# Patient Record
Sex: FEMALE | Race: WHITE | Employment: FULL TIME
[De-identification: names, ages, dates, MRNs, and addresses within clinical notes are randomized per-mention and may not be internally consistent; named-entity substitution may affect disease eponyms.]

---

## 2017-01-11 ENCOUNTER — TELEPHONE (OUTPATIENT)
Dept: NEUROLOGY | Facility: CLINIC | Age: 57
End: 2017-01-11

## 2017-01-12 ENCOUNTER — TELEPHONE (OUTPATIENT)
Dept: NEUROSURGERY | Facility: CLINIC | Age: 57
End: 2017-01-12

## 2017-01-16 ENCOUNTER — TELEPHONE (OUTPATIENT)
Dept: NEUROSURGERY | Facility: CLINIC | Age: 57
End: 2017-01-16

## 2017-03-07 ENCOUNTER — OFFICE VISIT (OUTPATIENT)
Dept: NEUROLOGY | Facility: CLINIC | Age: 57
End: 2017-03-07

## 2017-03-07 VITALS
SYSTOLIC BLOOD PRESSURE: 118 MMHG | HEIGHT: 65 IN | DIASTOLIC BLOOD PRESSURE: 90 MMHG | HEART RATE: 64 BPM | WEIGHT: 167.8 LBS | BODY MASS INDEX: 27.96 KG/M2

## 2017-03-07 DIAGNOSIS — I69.30 CHRONIC ISCHEMIC RIGHT MCA STROKE: Primary | ICD-10-CM

## 2017-03-07 PROCEDURE — 99214 OFFICE O/P EST MOD 30 MIN: CPT | Performed by: PSYCHIATRY & NEUROLOGY

## 2017-03-20 ENCOUNTER — EMPLOYEE WELLNESS (OUTPATIENT)
Dept: OTHER | Age: 57
End: 2017-03-20

## 2017-03-21 ENCOUNTER — TELEPHONE (OUTPATIENT)
Dept: NEUROLOGY | Age: 57
End: 2017-03-21

## 2017-03-30 RX ORDER — LAMOTRIGINE 25 MG/1
50 TABLET ORAL 2 TIMES DAILY
Qty: 120 TABLET | Refills: 2 | Status: SHIPPED | OUTPATIENT
Start: 2017-03-30 | End: 2017-06-22 | Stop reason: SDUPTHER

## 2017-04-25 ENCOUNTER — TELEPHONE (OUTPATIENT)
Dept: NEUROLOGY | Age: 57
End: 2017-04-25

## 2017-06-19 ENCOUNTER — OFFICE VISIT (OUTPATIENT)
Dept: NEUROLOGY | Age: 57
End: 2017-06-19
Payer: COMMERCIAL

## 2017-06-19 VITALS
SYSTOLIC BLOOD PRESSURE: 121 MMHG | HEIGHT: 65 IN | WEIGHT: 164.2 LBS | BODY MASS INDEX: 27.36 KG/M2 | DIASTOLIC BLOOD PRESSURE: 83 MMHG | HEART RATE: 65 BPM

## 2017-06-19 DIAGNOSIS — I60.9 SUBARACHNOID BLEED (HCC): Primary | ICD-10-CM

## 2017-06-19 PROCEDURE — 99214 OFFICE O/P EST MOD 30 MIN: CPT | Performed by: PSYCHIATRY & NEUROLOGY

## 2017-06-22 RX ORDER — LAMOTRIGINE 25 MG/1
TABLET ORAL
Qty: 14 TABLET | Refills: 0 | Status: SHIPPED | OUTPATIENT
Start: 2017-06-22 | End: 2018-06-15

## 2018-01-04 ENCOUNTER — TELEPHONE (OUTPATIENT)
Dept: NEUROLOGY | Age: 58
End: 2018-01-04

## 2018-01-04 NOTE — TELEPHONE ENCOUNTER
Pt called in stating she was tapered off of Lamictal last year. She was wondering if she could be put back on it, she is having some increased nerve pain and depression. She has an appt with you on 7/9/18.

## 2018-03-20 VITALS — BODY MASS INDEX: 26.96 KG/M2 | WEIGHT: 162 LBS

## 2018-04-03 ENCOUNTER — EMPLOYEE WELLNESS (OUTPATIENT)
Dept: OTHER | Age: 58
End: 2018-04-03

## 2018-04-03 LAB
CHOLESTEROL, TOTAL: 203 MG/DL (ref 0–199)
FASTING: YES
GLUCOSE BLD-MCNC: 94 MG/DL (ref 74–109)
HDLC SERPL-MCNC: 89 MG/DL (ref 40–90)
LDL CHOLESTEROL CALCULATED: 101 MG/DL
TRIGL SERPL-MCNC: 66 MG/DL (ref 0–199)

## 2018-04-10 VITALS — WEIGHT: 176 LBS | BODY MASS INDEX: 29.29 KG/M2

## 2018-06-15 ENCOUNTER — INITIAL CONSULT (OUTPATIENT)
Dept: NEUROLOGY | Age: 58
End: 2018-06-15
Payer: COMMERCIAL

## 2018-06-15 VITALS
HEART RATE: 72 BPM | SYSTOLIC BLOOD PRESSURE: 122 MMHG | BODY MASS INDEX: 30.73 KG/M2 | DIASTOLIC BLOOD PRESSURE: 80 MMHG | WEIGHT: 180 LBS | HEIGHT: 64 IN

## 2018-06-15 DIAGNOSIS — I69.30 HISTORY OF STROKE WITH RESIDUAL DEFICIT: Primary | ICD-10-CM

## 2018-06-15 PROCEDURE — 99244 OFF/OP CNSLTJ NEW/EST MOD 40: CPT | Performed by: PSYCHIATRY & NEUROLOGY

## 2018-06-15 RX ORDER — OMEPRAZOLE 20 MG/1
20 CAPSULE, DELAYED RELEASE ORAL DAILY
COMMUNITY

## 2018-06-15 RX ORDER — LAMOTRIGINE 100 MG/1
100 TABLET ORAL 2 TIMES DAILY
Qty: 60 TABLET | Refills: 2 | Status: SHIPPED | OUTPATIENT
Start: 2018-06-15 | End: 2018-11-21 | Stop reason: SDUPTHER

## 2018-06-15 RX ORDER — LAMOTRIGINE 100 MG/1
50 TABLET ORAL 2 TIMES DAILY
Qty: 30 TABLET | Refills: 2 | Status: SHIPPED | OUTPATIENT
Start: 2018-06-15 | End: 2018-06-15 | Stop reason: SDUPTHER

## 2018-06-19 ENCOUNTER — TELEPHONE (OUTPATIENT)
Dept: NEUROLOGY | Age: 58
End: 2018-06-19

## 2018-06-23 LAB
FOLATE: >20 UG/L
VITAMIN B-12: 614.6 PG/ML (ref 200–950)

## 2018-06-29 ENCOUNTER — HOSPITAL ENCOUNTER (OUTPATIENT)
Dept: CT IMAGING | Age: 58
Discharge: HOME | End: 2018-06-29
Payer: COMMERCIAL

## 2018-06-29 ENCOUNTER — HOSPITAL ENCOUNTER (OUTPATIENT)
Dept: WOMENS IMAGING | Age: 58
Discharge: HOME | End: 2018-06-29
Payer: COMMERCIAL

## 2018-06-29 ENCOUNTER — HOSPITAL ENCOUNTER (OUTPATIENT)
Dept: NEUROLOGY | Age: 58
Discharge: HOME | End: 2018-06-29
Payer: COMMERCIAL

## 2018-06-29 DIAGNOSIS — I69.30 HISTORY OF STROKE WITH RESIDUAL DEFICIT: ICD-10-CM

## 2018-06-29 DIAGNOSIS — Z12.31 VISIT FOR SCREENING MAMMOGRAM: ICD-10-CM

## 2018-06-29 PROCEDURE — 70496 CT ANGIOGRAPHY HEAD: CPT

## 2018-06-29 PROCEDURE — 6360000004 HC RX CONTRAST MEDICATION: Performed by: PSYCHIATRY & NEUROLOGY

## 2018-06-29 PROCEDURE — 95819 EEG AWAKE AND ASLEEP: CPT

## 2018-06-29 PROCEDURE — 77067 SCR MAMMO BI INCL CAD: CPT

## 2018-06-29 PROCEDURE — 70498 CT ANGIOGRAPHY NECK: CPT

## 2018-06-29 RX ADMIN — IOPAMIDOL 85 ML: 755 INJECTION, SOLUTION INTRAVENOUS at 14:14

## 2018-08-13 ENCOUNTER — HOSPITAL ENCOUNTER (OUTPATIENT)
Dept: ULTRASOUND IMAGING | Age: 58
Discharge: HOME | End: 2018-08-13
Payer: COMMERCIAL

## 2018-08-13 DIAGNOSIS — E04.1 THYROID NODULE: ICD-10-CM

## 2018-08-13 DIAGNOSIS — E01.0 THYROMEGALY: ICD-10-CM

## 2018-08-13 PROCEDURE — 76536 US EXAM OF HEAD AND NECK: CPT

## 2018-10-01 ENCOUNTER — HOSPITAL ENCOUNTER (OUTPATIENT)
Dept: ULTRASOUND IMAGING | Age: 58
Discharge: HOME | End: 2018-10-01
Payer: COMMERCIAL

## 2018-10-01 DIAGNOSIS — E04.2 MULTINODULAR GOITER: ICD-10-CM

## 2018-10-01 PROCEDURE — 88172 CYTP DX EVAL FNA 1ST EA SITE: CPT

## 2018-10-01 PROCEDURE — 88173 CYTOPATH EVAL FNA REPORT: CPT

## 2018-10-01 PROCEDURE — 76942 ECHO GUIDE FOR BIOPSY: CPT

## 2018-10-01 PROCEDURE — 88177 CYTP FNA EVAL EA ADDL: CPT

## 2018-11-01 ENCOUNTER — HOSPITAL ENCOUNTER (OUTPATIENT)
Dept: ULTRASOUND IMAGING | Age: 58
Discharge: HOME | End: 2018-11-01
Payer: COMMERCIAL

## 2018-11-01 DIAGNOSIS — E04.2 NONTOXIC MULTINODULAR GOITER: ICD-10-CM

## 2018-11-07 ENCOUNTER — OFFICE VISIT (OUTPATIENT)
Dept: UROLOGY | Age: 58
End: 2018-11-07
Payer: COMMERCIAL

## 2018-11-07 ENCOUNTER — TELEPHONE (OUTPATIENT)
Dept: UROLOGY | Age: 58
End: 2018-11-07

## 2018-11-07 VITALS
HEIGHT: 65 IN | DIASTOLIC BLOOD PRESSURE: 72 MMHG | WEIGHT: 170 LBS | SYSTOLIC BLOOD PRESSURE: 120 MMHG | BODY MASS INDEX: 28.32 KG/M2

## 2018-11-07 DIAGNOSIS — R35.0 URINARY FREQUENCY: Primary | ICD-10-CM

## 2018-11-07 LAB
BILIRUBIN URINE: NEGATIVE
BLOOD URINE, POC: NEGATIVE
CHARACTER, URINE: CLEAR
COLOR, URINE: YELLOW
GLUCOSE URINE: NEGATIVE MG/DL
KETONES, URINE: NEGATIVE
LEUKOCYTE CLUMPS, URINE: NEGATIVE
NITRITE, URINE: NEGATIVE
PH, URINE: 6
POST VOID RESIDUAL (PVR): 17 ML
PROTEIN, URINE: NEGATIVE MG/DL
SPECIFIC GRAVITY, URINE: 1.01 (ref 1–1.03)
UROBILINOGEN, URINE: 0.2 EU/DL

## 2018-11-07 PROCEDURE — 81003 URINALYSIS AUTO W/O SCOPE: CPT | Performed by: NURSE PRACTITIONER

## 2018-11-07 PROCEDURE — 51798 US URINE CAPACITY MEASURE: CPT | Performed by: NURSE PRACTITIONER

## 2018-11-07 PROCEDURE — 99203 OFFICE O/P NEW LOW 30 MIN: CPT | Performed by: NURSE PRACTITIONER

## 2018-11-07 NOTE — PROGRESS NOTES
and script sent. If symptoms do not improve I would suggest Botox and we discussed this today. She is nervous that \"something is wrong. \" UA is negative. Send for cytology. Follow-up in 4 weeks.            Electronically signed by MARY ALICE Rapp CNP on 11/7/2018 at 2:36 PM

## 2018-11-08 ENCOUNTER — TELEPHONE (OUTPATIENT)
Dept: UROLOGY | Age: 58
End: 2018-11-08

## 2018-11-08 NOTE — TELEPHONE ENCOUNTER
Dilan Perkins. Please let patient know. I gave her samples. She can see how it goes. She does not want to take any anti-cholinergics she told me yesterday. We also discussed Botox.

## 2018-11-14 RX ORDER — TROSPIUM CHLORIDE ER 60 MG/1
60 CAPSULE ORAL DAILY
Qty: 30 CAPSULE | Refills: 3 | Status: SHIPPED | OUTPATIENT
Start: 2018-11-14 | End: 2018-11-28

## 2018-11-21 ENCOUNTER — OFFICE VISIT (OUTPATIENT)
Dept: NEUROLOGY | Age: 58
End: 2018-11-21
Payer: COMMERCIAL

## 2018-11-21 VITALS
BODY MASS INDEX: 28.99 KG/M2 | HEIGHT: 65 IN | WEIGHT: 174 LBS | SYSTOLIC BLOOD PRESSURE: 126 MMHG | HEART RATE: 68 BPM | DIASTOLIC BLOOD PRESSURE: 72 MMHG

## 2018-11-21 DIAGNOSIS — I69.30 HISTORY OF STROKE WITH RESIDUAL DEFICIT: Primary | ICD-10-CM

## 2018-11-21 PROCEDURE — 99213 OFFICE O/P EST LOW 20 MIN: CPT | Performed by: PSYCHIATRY & NEUROLOGY

## 2018-11-21 RX ORDER — LAMOTRIGINE 100 MG/1
100 TABLET ORAL DAILY
Qty: 30 TABLET | Refills: 5 | Status: SHIPPED | OUTPATIENT
Start: 2018-11-21 | End: 2019-08-27 | Stop reason: SDUPTHER

## 2018-11-21 NOTE — PROGRESS NOTES
NEUROLOGY OUT PATIENT FOLLOW UP NOTE:  11/21/20181:18 PM    Severo Kuba is here for follow up for right MCA stroke. She had testing performed. She feels the Lamictal helps her with her mood, and concentration. She still feels she needs help with her concentration. She tried going off the medication the past and felt worse with her concentration ability of the medication, then went back on it. She is here to go over the results. ROS:  Cardiac: no chest pain. No palpitations. Renal : no flank pain, no hematuria    Skin: no rash      Allergies   Allergen Reactions    Keflex [Cephalexin]     Ciprofloxacin Rash       Current Outpatient Prescriptions:     trospium (SANCTURA) 60 MG CP24 extended release capsule, Take 1 capsule by mouth daily, Disp: 30 capsule, Rfl: 3    Multiple Vitamin (MULTI-VITAMIN DAILY PO), Take by mouth, Disp: , Rfl:     Mirabegron ER (MYRBETRIQ) 50 MG TB24, Take 50 mg by mouth daily, Disp: 30 tablet, Rfl: 3    omeprazole (PRILOSEC) 20 MG delayed release capsule, Take 20 mg by mouth daily, Disp: , Rfl:     lamoTRIgine (LAMICTAL) 100 MG tablet, Take 1 tablet by mouth 2 times daily, Disp: 60 tablet, Rfl: 2      PE:   Vitals:    11/21/18 1301   BP: 126/72   Site: Left Upper Arm   Position: Sitting   Cuff Size: Medium Adult   Pulse: 68   Weight: 174 lb (78.9 kg)   Height: 5' 4.5\" (1.638 m)     General Appearance:  alert and cooperative  Skin:  Skin color, texture, turgor normal. No rashes or lesions. Gen: NAD, Language is Intact. Head: no rash, no icterus  Neck: There is no carotid bruits. The Neck is supple. Neuro: CN 2-12 grossly intact with no focal deficits. Power 5/5 Throughout symmetric, Reflexes are symmetric. Long tracts are intact. Cerebellar exam is Intact. Sensory exam is intact to light touch. Gait is intact. Musculoskeletal:  Has no hand arthritis, no limitation of ROM in any of the four extremities.   Lower extremities no edema        DATA:  Results for orders significant  spinal canal stenosis or neural foraminal narrowing present. C3-C4: There is no disc bulge or protrusion present. There is no significant  spinal canal stenosis or neural foraminal narrowing present. C4-C5: There is a disc bulge present. There is no significant spinal canal  stenosis or neural foraminal narrowing. C5-C6: There is a disc bulge and uncovertebral overgrowth. There is moderate  bilateral neural foraminal narrowing. No significant spinal canal stenosis  is present. C6-C7: There is no disc bulge or protrusion present. There is no significant  spinal canal stenosis or neural foraminal narrowing present. C7-T1: There is no disc bulge or protrusion present. There is no significant  spinal canal stenosis or neural foraminal narrowing present. Impression 1. Disc bulge and uncovertebral overgrowth at C5-6 resulting in moderate  bilateral neural foraminal narrowing. 2. Disc bulge at C4-5 without significant spinal canal stenosis or neural  foraminal narrowing. 3. Type 1 degenerative endplate changes at H9-1 which could represent a  source of neck pain. Results for orders placed during the hospital encounter of 10/25/16   MRA Head WO Contrast    Narrative EXAMINATION:  MRA OF THE HEAD WITHOUT CONTRAST  11/1/2016 4:05 pm:    TECHNIQUE:  MRA of the head was performed utilizing time-of-flight imaging with MIP  images. No intravenous contrast was administered. COMPARISON:  None. HISTORY:  ORDERING SYSTEM PROVIDED HISTORY: STROKE    FINDINGS:  ANTERIOR CIRCULATION: The internal carotid arteries are normal in course and  caliber without focal stenosis. T the left middle cerebral artery is patent. The left anterior cerebral artery is patent. Patent anterior communicating  artery. There is occlusion of the parietal temporal branch of the right middle  cerebral artery.   Narrowing of the superior frontal branch of the right  middle cerebral artery also noted temporal lobe, right basal ganglia and right frontal lobe. **This report has been created using voice recognition software. It may contain minor errors which are inherent in voice recognition technology. **    Final report electronically signed by Dr. Isaías Gonzáles on 2016 11:47 AM        EE2018  IMPRESSION:  This is a normal EEG. There was no evidence of epileptiform  activity appreciated. Assessment:     Diagnosis Orders   1. History of stroke with residual deficit          She reports not new symptoms. She did not tolerate the higher dose of Lamictal. She is pleased with how she is doing. Her CTA head and neck were unremarkable. After detailed discussion with patient we agreed on the following plan. Plan:  1. Change Lamictal to 100 mg a day. 2. Call with any new symptoms or concerns. 3. Follow up in 6 months. Please call if any questions.      Carmita Leonard MD

## 2018-11-26 ENCOUNTER — HOSPITAL ENCOUNTER (OUTPATIENT)
Dept: ULTRASOUND IMAGING | Age: 58
Discharge: HOME | End: 2018-11-26
Payer: COMMERCIAL

## 2018-11-26 DIAGNOSIS — E04.2 NONTOXIC MULTINODULAR GOITER: ICD-10-CM

## 2018-11-26 PROCEDURE — 88173 CYTOPATH EVAL FNA REPORT: CPT

## 2018-11-26 PROCEDURE — 76942 ECHO GUIDE FOR BIOPSY: CPT

## 2018-11-26 PROCEDURE — 88177 CYTP FNA EVAL EA ADDL: CPT

## 2018-11-26 PROCEDURE — 88172 CYTP DX EVAL FNA 1ST EA SITE: CPT

## 2018-11-28 ENCOUNTER — TELEPHONE (OUTPATIENT)
Dept: UROLOGY | Age: 58
End: 2018-11-28

## 2018-11-28 RX ORDER — TROSPIUM CHLORIDE ER 60 MG/1
60 CAPSULE ORAL DAILY
Qty: 30 CAPSULE | Refills: 3 | Status: SHIPPED | OUTPATIENT
Start: 2018-11-28 | End: 2019-12-19

## 2019-04-12 ENCOUNTER — EMPLOYEE WELLNESS (OUTPATIENT)
Dept: OTHER | Age: 59
End: 2019-04-12

## 2019-04-12 LAB
CHOLESTEROL, TOTAL: 195 MG/DL (ref 0–199)
FASTING: YES
GLUCOSE BLD-MCNC: 89 MG/DL (ref 74–109)
HDLC SERPL-MCNC: 71 MG/DL (ref 40–90)
LDL CHOLESTEROL CALCULATED: 105 MG/DL
TRIGL SERPL-MCNC: 94 MG/DL (ref 0–199)

## 2019-04-15 VITALS — WEIGHT: 175 LBS | BODY MASS INDEX: 29.57 KG/M2

## 2019-08-27 DIAGNOSIS — I69.30 HISTORY OF STROKE WITH RESIDUAL DEFICIT: Primary | ICD-10-CM

## 2019-08-27 RX ORDER — LAMOTRIGINE 100 MG/1
TABLET ORAL
Qty: 30 TABLET | Refills: 5 | Status: SHIPPED | OUTPATIENT
Start: 2019-08-27 | End: 2019-11-06 | Stop reason: SDUPTHER

## 2019-10-10 ENCOUNTER — OFFICE VISIT (OUTPATIENT)
Dept: NEUROLOGY | Age: 59
End: 2019-10-10
Payer: COMMERCIAL

## 2019-10-10 VITALS
HEART RATE: 64 BPM | DIASTOLIC BLOOD PRESSURE: 62 MMHG | SYSTOLIC BLOOD PRESSURE: 110 MMHG | WEIGHT: 174 LBS | BODY MASS INDEX: 28.99 KG/M2 | HEIGHT: 65 IN

## 2019-10-10 DIAGNOSIS — I69.30 HISTORY OF STROKE WITH RESIDUAL DEFICIT: Primary | ICD-10-CM

## 2019-10-10 PROCEDURE — 99213 OFFICE O/P EST LOW 20 MIN: CPT | Performed by: NURSE PRACTITIONER

## 2019-10-10 RX ORDER — VITAMIN B COMPLEX
1 CAPSULE ORAL DAILY
COMMUNITY

## 2019-11-06 DIAGNOSIS — I69.30 HISTORY OF STROKE WITH RESIDUAL DEFICIT: Primary | ICD-10-CM

## 2019-11-06 RX ORDER — LAMOTRIGINE 150 MG/1
TABLET ORAL
Qty: 30 TABLET | Refills: 3 | Status: SHIPPED | OUTPATIENT
Start: 2019-11-06 | End: 2020-03-04 | Stop reason: SDUPTHER

## 2019-12-03 ENCOUNTER — HOSPITAL ENCOUNTER (OUTPATIENT)
Dept: WOMENS IMAGING | Age: 59
Discharge: HOME | End: 2019-12-03
Payer: COMMERCIAL

## 2019-12-03 DIAGNOSIS — Z12.31 VISIT FOR SCREENING MAMMOGRAM: ICD-10-CM

## 2019-12-03 PROCEDURE — 77063 BREAST TOMOSYNTHESIS BI: CPT

## 2019-12-19 ENCOUNTER — OFFICE VISIT (OUTPATIENT)
Dept: PSYCHIATRY | Age: 59
End: 2019-12-19
Payer: COMMERCIAL

## 2019-12-19 DIAGNOSIS — F39 MOOD DISORDER (HCC): Primary | ICD-10-CM

## 2019-12-19 DIAGNOSIS — F41.9 ANXIETY: ICD-10-CM

## 2019-12-19 DIAGNOSIS — R41.3 MEMORY PROBLEM: ICD-10-CM

## 2019-12-19 PROCEDURE — 90792 PSYCH DIAG EVAL W/MED SRVCS: CPT | Performed by: PSYCHIATRY & NEUROLOGY

## 2019-12-19 RX ORDER — FLUOXETINE HYDROCHLORIDE 20 MG/1
20 CAPSULE ORAL DAILY
Qty: 30 CAPSULE | Refills: 1 | Status: SHIPPED | OUTPATIENT
Start: 2019-12-19 | End: 2020-02-18

## 2020-02-18 RX ORDER — FLUOXETINE HYDROCHLORIDE 20 MG/1
CAPSULE ORAL
Qty: 30 CAPSULE | Refills: 1 | Status: SHIPPED | OUTPATIENT
Start: 2020-02-18 | End: 2020-03-04 | Stop reason: SDUPTHER

## 2020-02-18 NOTE — TELEPHONE ENCOUNTER
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 [address] [place] [address] is requesting a medication on Adriana's behalf for Prozac 20mg;#30 with 1 refill;last with a start date of 12/19/19 and last refill date of 01/15/20. Medication is pending your approval for a 30 day supply with 1 refill. Patient's last completed appt was on 12/19/19 to return on 03/04/2020.

## 2020-03-04 ENCOUNTER — OFFICE VISIT (OUTPATIENT)
Dept: PSYCHIATRY | Age: 60
End: 2020-03-04
Payer: COMMERCIAL

## 2020-03-04 PROCEDURE — 99213 OFFICE O/P EST LOW 20 MIN: CPT | Performed by: PSYCHIATRY & NEUROLOGY

## 2020-03-04 RX ORDER — LAMOTRIGINE 150 MG/1
TABLET ORAL
Qty: 90 TABLET | Refills: 0 | Status: SHIPPED | OUTPATIENT
Start: 2020-03-04 | End: 2020-06-04 | Stop reason: SDUPTHER

## 2020-03-04 RX ORDER — FLUOXETINE HYDROCHLORIDE 20 MG/1
20 CAPSULE ORAL DAILY
Qty: 90 CAPSULE | Refills: 0 | Status: SHIPPED | OUTPATIENT
Start: 2020-03-04 | End: 2020-06-04 | Stop reason: SDUPTHER

## 2020-03-04 NOTE — PROGRESS NOTES
[facility] ([facility] PSYCHIATRY  [place] [id number] [phone]    Progress Note    Patient:  Kayla Milian  YOB: 1960  PCP:  Bree Bazan DO  Visit Date:  3/4/2020      Chief Complaint   Patient presents with    Follow-up    Medication Check       SUBJECTIVE:      Kayla Milian, a 62 y.o. female, presents for a follow up visit. Patient reports she is doing well. Patient is compliant with medication regimen. She presents alone. Thinks Prozac has improved energy level. May have improved irritability and mood. \"Less edgy. \" Work has been better as less busy but still loses her place. Trouble multi-tasking. Has trouble following recipes. Will reread things. Denies depressed mood. Wonders if she could go off Lamictal. No longer being used for seizure prophylaxis. It did improve mood lability and irritability and we agree to leave it at current dose for now. Biggest issue is her focus. Sleep is good, improved with Prozac. Memory about the same. Denies worsening. Never had NPT. Discussed that option which she may consider.;  Was more likely to have outburst or be outspoken after her CVA/SAH which was not like her prior personality. Discusses the impacts on her home and work life. No SI. Past Medical, Social, Family Hx: see above    ROS:  Med Trials:Elavil, Lamictal, Wellbutrin (didn't like), Prozac    OBJECTIVE:  Vitals: There were no vitals taken for this visit. MENTAL STATUS EXAM:    GENERAL  Build: Normal    Hygiene:  Appropriate    SENSORIUM Orientation: Place, Person, Time, & Situation     Consciousness: Alert    ATTENTION   Focused    RELATEDNESS  Cooperative    EYE CONTACT   Good    PSYCHOMOTOR  Normal    SPEECH Volume: Normal     Rate: Normal rate and tone    Amplitude:  Within normal limits   MOOD  Euthymic    AFFECT Range: Full    THOUGHT Process:  Goal-Directed     Content: no

## 2020-03-26 ENCOUNTER — NURSE ONLY (OUTPATIENT)
Dept: LAB | Age: 60
End: 2020-03-26

## 2020-03-26 ENCOUNTER — NURSE TRIAGE (OUTPATIENT)
Dept: OTHER | Facility: CLINIC | Age: 60
End: 2020-03-26

## 2020-03-26 LAB
ALBUMIN SERPL-MCNC: 4.1 G/DL (ref 3.5–5.1)
ALP BLD-CCNC: 97 U/L (ref 38–126)
ALT SERPL-CCNC: 32 U/L (ref 11–66)
ANION GAP SERPL CALCULATED.3IONS-SCNC: 12 MEQ/L (ref 8–16)
AST SERPL-CCNC: 24 U/L (ref 5–40)
BASOPHILS # BLD: 0.7 %
BASOPHILS ABSOLUTE: 0 THOU/MM3 (ref 0–0.1)
BILIRUB SERPL-MCNC: 0.4 MG/DL (ref 0.3–1.2)
BUN BLDV-MCNC: 11 MG/DL (ref 7–22)
CALCIUM SERPL-MCNC: 8.9 MG/DL (ref 8.5–10.5)
CHLORIDE BLD-SCNC: 106 MEQ/L (ref 98–111)
CHOLESTEROL, TOTAL: 197 MG/DL (ref 100–199)
CO2: 25 MEQ/L (ref 23–33)
CREAT SERPL-MCNC: 0.7 MG/DL (ref 0.4–1.2)
EOSINOPHIL # BLD: 4.9 %
EOSINOPHILS ABSOLUTE: 0.3 THOU/MM3 (ref 0–0.4)
ERYTHROCYTE [DISTWIDTH] IN BLOOD BY AUTOMATED COUNT: 13 % (ref 11.5–14.5)
ERYTHROCYTE [DISTWIDTH] IN BLOOD BY AUTOMATED COUNT: 44.6 FL (ref 35–45)
GFR SERPL CREATININE-BSD FRML MDRD: 86 ML/MIN/1.73M2
GLUCOSE BLD-MCNC: 100 MG/DL (ref 70–108)
HCT VFR BLD CALC: 44.4 % (ref 37–47)
HDLC SERPL-MCNC: 64 MG/DL
HEMOGLOBIN: 13.9 GM/DL (ref 12–16)
IMMATURE GRANS (ABS): 0.02 THOU/MM3 (ref 0–0.07)
IMMATURE GRANULOCYTES: 0.4 %
LDL CHOLESTEROL CALCULATED: 111 MG/DL
LYMPHOCYTES # BLD: 26.9 %
LYMPHOCYTES ABSOLUTE: 1.5 THOU/MM3 (ref 1–4.8)
MAGNESIUM: 2.3 MG/DL (ref 1.6–2.4)
MCH RBC QN AUTO: 29.3 PG (ref 26–33)
MCHC RBC AUTO-ENTMCNC: 31.3 GM/DL (ref 32.2–35.5)
MCV RBC AUTO: 93.7 FL (ref 81–99)
MONOCYTES # BLD: 9.3 %
MONOCYTES ABSOLUTE: 0.5 THOU/MM3 (ref 0.4–1.3)
NUCLEATED RED BLOOD CELLS: 0 /100 WBC
PLATELET # BLD: 237 THOU/MM3 (ref 130–400)
PMV BLD AUTO: 9.5 FL (ref 9.4–12.4)
POTASSIUM SERPL-SCNC: 4.3 MEQ/L (ref 3.5–5.2)
RBC # BLD: 4.74 MILL/MM3 (ref 4.2–5.4)
SEG NEUTROPHILS: 57.8 %
SEGMENTED NEUTROPHILS ABSOLUTE COUNT: 3.1 THOU/MM3 (ref 1.8–7.7)
SODIUM BLD-SCNC: 143 MEQ/L (ref 135–145)
T4 FREE: 1.13 NG/DL (ref 0.93–1.76)
TOTAL PROTEIN: 6.2 G/DL (ref 6.1–8)
TRIGL SERPL-MCNC: 108 MG/DL (ref 0–199)
TSH SERPL DL<=0.05 MIU/L-ACNC: 2.13 UIU/ML (ref 0.4–4.2)
VITAMIN B-12: 1572 PG/ML (ref 211–911)
WBC # BLD: 5.4 THOU/MM3 (ref 4.8–10.8)

## 2020-05-18 ENCOUNTER — TELEPHONE (OUTPATIENT)
Dept: PSYCHIATRY | Age: 60
End: 2020-05-18

## 2020-05-18 NOTE — TELEPHONE ENCOUNTER
Oly Jc called the office to see if she could move her appointment up sooner. than scheduled on 6/4. Unfortunately, there is not availability sooner. She reports that she has been struggling with work. She states that her workload has increased and she finds it difficult to keep up with it. She said that it was mentioned during her last visit the possibility of a trial of Adderall. She is asking if she would be able to do this before her scheduled appointment. Her last completed appointment was on 3/4.

## 2020-05-18 NOTE — TELEPHONE ENCOUNTER
I should have some openings this week once I convert patients to virtual visits. I will reach out to her when I have an open slot to offer.    Electronically signed by Yony Vyas MD on 5/18/2020 at 5:30 PM

## 2020-06-04 ENCOUNTER — VIRTUAL VISIT (OUTPATIENT)
Dept: PSYCHIATRY | Age: 60
End: 2020-06-04
Payer: COMMERCIAL

## 2020-06-04 PROCEDURE — 99214 OFFICE O/P EST MOD 30 MIN: CPT | Performed by: PSYCHIATRY & NEUROLOGY

## 2020-06-04 RX ORDER — FLUOXETINE HYDROCHLORIDE 40 MG/1
40 CAPSULE ORAL DAILY
Qty: 90 CAPSULE | Refills: 0 | Status: SHIPPED | OUTPATIENT
Start: 2020-06-04 | End: 2020-12-02 | Stop reason: SDUPTHER

## 2020-06-04 RX ORDER — LAMOTRIGINE 150 MG/1
TABLET ORAL
Qty: 90 TABLET | Refills: 0 | Status: SHIPPED | OUTPATIENT
Start: 2020-06-04 | End: 2020-12-02 | Stop reason: SDUPTHER

## 2020-06-04 NOTE — PROGRESS NOTES
[address]  [facility] [address]  [phone]    Progress Note    Patient:  Radha Mckee  YOB: 1960  PCP:  Jenn Rasmussen DO  Visit Date:  6/4/2020    TELEHEALTH EVALUATION -- Audio/Visual (During RMSWU-73 public health emergency)    Patient location: home  Physician location: home, [facility] [address]  This virtual visit was conducted via interactive, real-time video. Chief Complaint   Patient presents with    Follow-up    Medication Check       SUBJECTIVE:      Radha Mckee, a 62 y.o. female, presents for a follow up visit. Patient reports she is stable. Patient is compliant with medication regimen. She presents alone. Doing well. Had called recently c/o more trouble with focus. Cites work changes d/t [id number] and work got busier and \"continued to get busier\". Notes business and work load has increased more. 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 Things feel stressful. Can't get ahead, always behind. Feels they aren't using their employees well. A coworker has stepped in to help with her work load because of her trouble with multi-tasking. Working on 2-3 things at once then [employer] stops on me\". Will forget what button to click. Then feels \"very agitated\" and at times has to remove herself. Discussed with her that it seems her anxiety/stress/workload is worsening focus and she agrees. Mood is somewhat depressed. Sleep \"mostly good\" but some nights it has worsened. \"The covid stuff, I worried about that. \" Frida Pillar her 79 yo mother. Energy is low. No SI. Her dtr is getting a divorce and she is moving back to the area. Willing to try Prozac increase to see if we can improve anxiety and mood. Past Medical, Social, Family Hx: see above    ROS:  Med Trials:Elavil, Lamictal, Wellbutrin (didn't like), Prozac    OBJECTIVE:  Vitals:  There were no vitals taken for this Vitals/Constitutional/EENT/Resp/CV/GI//MS/Neuro/Skin/Heme-Lymph-Imm. Pursuant to the emergency declaration under the [address] [place] [address] and the [employer] and [employer] Act, this Virtual Visit was conducted with patient's (and/or legal guardian's) consent, to reduce the patient's risk of exposure to COVID-19 and provide necessary medical care. The patient (and/or legal guardian) has also been advised to contact this office for worsening conditions or problems, and seek emergency medical treatment and/or call 911 if deemed necessary. Patient identification was verified at the start of the visit: Yes    Total time spent for this encounter: Not billed by time    Services were provided through a video synchronous discussion virtually to substitute for in-person clinic visit. Patient and provider were located at their individual homes. --Flaquito Montes MD on 6/4/2020 at 1:57 PM    An electronic signature was used to authenticate this note.

## 2020-10-13 ENCOUNTER — NURSE TRIAGE (OUTPATIENT)
Dept: OTHER | Facility: CLINIC | Age: 60
End: 2020-10-13

## 2020-10-13 ENCOUNTER — HOSPITAL ENCOUNTER (OUTPATIENT)
Age: 60
Setting detail: SPECIMEN
Discharge: HOME | End: 2020-10-13
Payer: COMMERCIAL

## 2020-10-13 PROCEDURE — U0003 INFECTIOUS AGENT DETECTION BY NUCLEIC ACID (DNA OR RNA); SEVERE ACUTE RESPIRATORY SYNDROME CORONAVIRUS 2 (SARS-COV-2) (CORONAVIRUS DISEASE [COVID-19]), AMPLIFIED PROBE TECHNIQUE, MAKING USE OF HIGH THROUGHPUT TECHNOLOGIES AS DESCRIBED BY CMS-2020-01-R: HCPCS

## 2020-10-13 NOTE — TELEPHONE ENCOUNTER
Reason for Disposition   [1] COVID-19 infection suspected by caller or triager AND [2] mild symptoms (cough, fever, or others) AND [8] no complications or SOB    Answer Assessment - Initial Assessment Questions  1. COVID-19 DIAGNOSIS: \"Who made your Coronavirus (COVID-19) diagnosis? \" \"Was it confirmed by a positive lab test?\" If not diagnosed by a HCP, ask \"Are there lots of cases (community spread) where you live? \" (See public health department website, if unsure)      no  2. ONSET: \"When did the COVID-19 symptoms start? \"       Yesterday   3. WORST SYMPTOM: \"What is your worst symptom? \" (e.g., cough, fever, shortness of breath, muscle aches)      Cough   4. COUGH: \"Do you have a cough? \" If so, ask: \"How bad is the cough? \"        Yes   5. FEVER \"Do you have a fever? \" If so, ask: \"What is your temperature, how was it measured, and when did it start? \"      no  6. RESPIRATORY STATUS: \"Describe your breathing? \" (e.g., shortness of breath, wheezing, unable to speak)       Yes minor SOB   7. BETTER-SAME-WORSE: Alphia Shan you getting better, staying the same or getting worse compared to yesterday? \"  If getting worse, ask, \"In what way? \"      Worse   8. HIGH RISK DISEASE: \"Do you have any chronic medical problems? \" (e.g., asthma, heart or lung disease, weak immune system, etc.)      no  9. PREGNANCY: \"Is there any chance you are pregnant? \" \"When was your last menstrual period? \"      no  10. OTHER SYMPTOMS: \"Do you have any other symptoms? \"  (e.g., chills, fatigue, headache, loss of smell or taste, muscle pain, sore throat)       Sore throat    Protocols used: CORONAVIRUS (COVID-19) DIAGNOSED OR SUSPECTED-ADULT-[place]    Patient called pre-service center [facility]) [address] with red flag complaint. Brief description of triage: pt has some fatigue, headache, minor SOB, and sore throat. PT has VV with PCP today and will discuss COVD-19 testing.      Triage indicates for patient to home care    Care advice provided, patient

## 2020-10-14 LAB — SARS-COV-2: NOT DETECTED

## 2020-12-02 ENCOUNTER — VIRTUAL VISIT (OUTPATIENT)
Dept: PSYCHIATRY | Age: 60
End: 2020-12-02
Payer: COMMERCIAL

## 2020-12-02 PROCEDURE — 99214 OFFICE O/P EST MOD 30 MIN: CPT | Performed by: PSYCHIATRY & NEUROLOGY

## 2020-12-02 RX ORDER — FLUOXETINE HYDROCHLORIDE 20 MG/1
20 CAPSULE ORAL DAILY
Qty: 90 CAPSULE | Refills: 1 | Status: SHIPPED | OUTPATIENT
Start: 2020-12-02 | End: 2021-05-31

## 2020-12-02 RX ORDER — LAMOTRIGINE 150 MG/1
TABLET ORAL
Qty: 90 TABLET | Refills: 0 | Status: SHIPPED | OUTPATIENT
Start: 2020-12-02

## 2020-12-02 NOTE — PROGRESS NOTES
[address]  [facility] [address]  [phone]    Progress Note    Patient:  Becky Olson  YOB: 1960  PCP:  Jessica Ocampo DO  Visit Date:  12/2/2020    TELEHEALTH EVALUATION -- Audio/Visual (During YHFRL-01 public health emergency)    Patient location: home  Physician location: home, [address]  This virtual visit was conducted via interactive, real-time video. Chief Complaint   Patient presents with    Follow-up    Anxiety    Medication Check       SUBJECTIVE:      Becky Olson, a 62 y.o. female, presents for a follow up visit. Patient reports she is doing well. Patient is compliant with medication regimen. She presents alone. Mood is feeling stable. Denies feeling depressed. Anxiety is intermittent and manageable. Tends to feel anxious at work but not at home. Feeling more hungry on higher Prozac dose (40 mg). She ran out and wants to go back to lower dose. Last took it about 3 weeks ago. I'm ok with reducing dose. Some work stress. 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 Declines need for any further med change. Focus \"not so bad\" citing a lower workload. Energy feels low at home, doesn't get much done. Less motivation. No SI. Denies trouble with memory currently. Past Medical, Social, Family Hx: see above    ROS:  Med Trials:Elavil, Lamictal, Wellbutrin (didn't like), Prozac    OBJECTIVE:  Vitals: There were no vitals taken for this visit. MENTAL STATUS EXAM:    GENERAL  Build: Normal    Hygiene:  Appropriate   SENSORIUM Orientation: Place, Person, Time, & Situation     Consciousness: Alert    ATTENTION   Focused    RELATEDNESS  Cooperative    EYE CONTACT   Good    PSYCHOMOTOR  Normal    SPEECH Volume: Normal     Rate: Normal rate and tone    Amplitude:  Within normal limits   MOOD  Euthymic    AFFECT Range: Full    THOUGHT Process: Goal-Directed     Content: no evidence of psychosis    COGNITION Insight: Good    Judgement:  Intact    MEMORY  no gross deficits    INTELLIGENCE  Average     Mobility/Gait: Independently     Controlled SubstancesMonitoring:   not done      ASSESSMENT: Will decrease Prozac back to 20 mg d/t increased appetite. Mood stable. Anxiety is intermittent but manageable. No SI. Will monitor memory. Focus improved. Prozac improved energy and irritability. Will consider NPT for memory and focus. Lamictal improved mood lability and irritability. Diagnosis Orders   1. Mood disorder ([address]     2. Anxiety     3. Memory problem     Rule Out: BD vs MDD, VALENTINA, MCI  PMH: h/o CVA and SAH, GERD, OA    PLAN:     · Medications:   · decrease Prozac to 20 mg QAM (from 40 mg)  · Lamictal 150 mg QD  · Therapy: none    · Labs/Tests/Imaging: none  · Records Reviewed: [facility]  · Patient advised to call if patient has any difficulties with treatment  Return in about 6 months (around 6/2/2021) for med check, follow up. Electronically signed by Codey Villegas MD on 12/2/2020 at 2:48 PM      Marta Oliver is a 62 y.o. female being evaluated by a Virtual Visit (video visit) encounter to address concerns as mentioned above. A caregiver was present when appropriate. Due to this being a TeleHealth encounter (During [facility] public health emergency), evaluation of the following organ systems was limited: Vitals/Constitutional/EENT/Resp/CV/GI//MS/Neuro/Skin/Heme-Lymph-Imm. Pursuant to the emergency declaration under the [address] [place] [address], [address] [place] [address] authority and the [employer] and [employer] Act, this Virtual Visit was conducted with patient's (and/or legal guardian's) consent, to reduce the patient's risk of exposure to COVID-19 and provide necessary medical care.   The patient (and/or legal guardian) has also been advised to contact this office for worsening conditions or problems, and seek emergency medical treatment and/or call 911 if deemed necessary. Patient identification was verified at the start of the visit: Yes    Total time spent for this encounter: Not billed by time    Services were provided through a video synchronous discussion virtually to substitute for in-person clinic visit. Patient and provider were located at their individual homes. --Aaron Gill MD on 12/2/2020 at 2:48 PM    An electronic signature was used to authenticate this note.

## 2021-06-14 ENCOUNTER — HOSPITAL ENCOUNTER (OUTPATIENT)
Age: 61
Discharge: HOME | End: 2021-06-14
Payer: COMMERCIAL

## 2021-06-14 ENCOUNTER — HOSPITAL ENCOUNTER (OUTPATIENT)
Dept: GENERAL RADIOLOGY | Age: 61
Discharge: HOME | End: 2021-06-14
Payer: COMMERCIAL

## 2021-06-14 DIAGNOSIS — Z01.818 PRE-OP EXAM: ICD-10-CM

## 2021-06-14 LAB
EKG ATRIAL RATE: 75 BPM
EKG P AXIS: 78 DEGREES
EKG P-R INTERVAL: 150 MS
EKG Q-T INTERVAL: 412 MS
EKG QRS DURATION: 84 MS
EKG QTC CALCULATION (BAZETT): 460 MS
EKG R AXIS: 52 DEGREES
EKG T AXIS: 76 DEGREES
EKG VENTRICULAR RATE: 75 BPM

## 2021-06-14 PROCEDURE — 71046 X-RAY EXAM CHEST 2 VIEWS: CPT

## 2021-06-14 PROCEDURE — 93010 ELECTROCARDIOGRAM REPORT: CPT | Performed by: INTERNAL MEDICINE

## 2021-06-14 PROCEDURE — 93005 ELECTROCARDIOGRAM TRACING: CPT | Performed by: SPECIALIST

## 2021-06-18 RX ORDER — TOPIRAMATE 25 MG/1
50 TABLET ORAL DAILY
COMMUNITY

## 2021-06-18 NOTE — PROGRESS NOTES
NPO after midnight except for sip of water with heart/BP meds  Follow instructions given by surgeon including medications to hold   Bring insurance card and photo ID  Shower morning of surgery with liquid antibacterial soap  Wear loose comfortable clothing  Remove jewelry and do not bring valuables  Bring list of medications with dosages and how often taken if not reviewed with PAT    needed at discharge at ase 26years old  Call PAT at [phone] for questions

## 2021-06-28 ENCOUNTER — HOSPITAL ENCOUNTER (OUTPATIENT)
Age: 61
Setting detail: OUTPATIENT SURGERY
Discharge: HOME | End: 2021-06-28
Attending: SPECIALIST | Admitting: SPECIALIST

## 2021-06-28 VITALS
DIASTOLIC BLOOD PRESSURE: 58 MMHG | RESPIRATION RATE: 18 BRPM | WEIGHT: 164.8 LBS | HEIGHT: 64 IN | OXYGEN SATURATION: 96 % | TEMPERATURE: 97.2 F | HEART RATE: 77 BPM | SYSTOLIC BLOOD PRESSURE: 126 MMHG | BODY MASS INDEX: 28.13 KG/M2

## 2021-06-28 PROCEDURE — 2500000003 HC RX 250 WO HCPCS: Performed by: SPECIALIST

## 2021-06-28 PROCEDURE — 7100000011 HC PHASE II RECOVERY - ADDTL 15 MIN: Performed by: SPECIALIST

## 2021-06-28 PROCEDURE — 99152 MOD SED SAME PHYS/QHP 5/>YRS: CPT | Performed by: SPECIALIST

## 2021-06-28 PROCEDURE — 3600000012 HC SURGERY LEVEL 2 ADDTL 15MIN: Performed by: SPECIALIST

## 2021-06-28 PROCEDURE — 3600000002 HC SURGERY LEVEL 2 BASE: Performed by: SPECIALIST

## 2021-06-28 PROCEDURE — 2709999900 HC NON-CHARGEABLE SUPPLY: Performed by: SPECIALIST

## 2021-06-28 PROCEDURE — 6370000000 HC RX 637 (ALT 250 FOR IP)

## 2021-06-28 PROCEDURE — 7100000010 HC PHASE II RECOVERY - FIRST 15 MIN: Performed by: SPECIALIST

## 2021-06-28 PROCEDURE — 99153 MOD SED SAME PHYS/QHP EA: CPT | Performed by: SPECIALIST

## 2021-06-28 PROCEDURE — 6360000002 HC RX W HCPCS: Performed by: SPECIALIST

## 2021-06-28 RX ORDER — SCOLOPAMINE TRANSDERMAL SYSTEM 1 MG/1
1 PATCH, EXTENDED RELEASE TRANSDERMAL ONCE
Status: DISCONTINUED | OUTPATIENT
Start: 2021-06-28 | End: 2021-06-28 | Stop reason: HOSPADM

## 2021-06-28 RX ORDER — CEFAZOLIN SODIUM 1 G/3ML
INJECTION, POWDER, FOR SOLUTION INTRAMUSCULAR; INTRAVENOUS PRN
Status: DISCONTINUED | OUTPATIENT
Start: 2021-06-28 | End: 2021-06-28 | Stop reason: ALTCHOICE

## 2021-06-28 RX ORDER — SODIUM CHLORIDE 9 MG/ML
INJECTION, SOLUTION INTRAVENOUS ONCE
Status: DISCONTINUED | OUTPATIENT
Start: 2021-06-28 | End: 2021-06-28 | Stop reason: HOSPADM

## 2021-06-28 RX ORDER — LIDOCAINE HYDROCHLORIDE AND EPINEPHRINE 10; 10 MG/ML; UG/ML
INJECTION, SOLUTION INFILTRATION; PERINEURAL PRN
Status: DISCONTINUED | OUTPATIENT
Start: 2021-06-28 | End: 2021-06-28 | Stop reason: ALTCHOICE

## 2021-06-28 RX ORDER — ONDANSETRON 2 MG/ML
INJECTION INTRAMUSCULAR; INTRAVENOUS PRN
Status: DISCONTINUED | OUTPATIENT
Start: 2021-06-28 | End: 2021-06-28 | Stop reason: ALTCHOICE

## 2021-06-28 RX ORDER — SCOLOPAMINE TRANSDERMAL SYSTEM 1 MG/1
PATCH, EXTENDED RELEASE TRANSDERMAL
Status: DISCONTINUED
Start: 2021-06-28 | End: 2021-06-28 | Stop reason: HOSPADM

## 2021-06-28 RX ORDER — FENTANYL CITRATE 50 UG/ML
INJECTION, SOLUTION INTRAMUSCULAR; INTRAVENOUS PRN
Status: DISCONTINUED | OUTPATIENT
Start: 2021-06-28 | End: 2021-06-28 | Stop reason: ALTCHOICE

## 2021-06-28 RX ORDER — BALANCED SALT SOLUTION ENRICHED WITH BICARBONATE, DEXTROSE, AND GLUTATHIONE
KIT INTRAOCULAR PRN
Status: DISCONTINUED | OUTPATIENT
Start: 2021-06-28 | End: 2021-06-28 | Stop reason: ALTCHOICE

## 2021-06-28 RX ORDER — MIDAZOLAM HYDROCHLORIDE 1 MG/ML
INJECTION INTRAMUSCULAR; INTRAVENOUS PRN
Status: DISCONTINUED | OUTPATIENT
Start: 2021-06-28 | End: 2021-06-28 | Stop reason: ALTCHOICE

## 2021-06-28 ASSESSMENT — PAIN - FUNCTIONAL ASSESSMENT: PAIN_FUNCTIONAL_ASSESSMENT: 0-10

## 2021-06-28 NOTE — H&P
[address] [place] [address]  History and Physical Update    Pt Name: Fran Arenas  MRN: [id number]  YOB: 1960  Date of evaluation: 6/28/2021    I have examined the patient and reviewed the H&P/Consult and there are no changes to the patient or plans.       Adrian Mantilla MD  Electronically signed 6/28/2021 at 6:54 AM

## 2021-06-28 NOTE — PROGRESS NOTES
1441- Pt to PACU phase 2 Anisha RN at bedside, pt had IV sedation, pt hooked up to monitor, VSS, pt asking when she can go, updated give me 15 min to get her checked in and discharge instructions, pt has bilateral eye incisions scant amt of drainage, opthomalic baci to both eyes, pt has a steri strip on both sides of outside of eyes and mid eye on bridge of nose, all steri strips dry and intact.    Domitilantanne marie 72 given  [phone]- Daughter called  1446- Cold compress times 2 given   1448- Daughter back  1454-IV removed  1501- Pt given discharge instructions daughter at bedside, verbalized understanding, pt states has meds at home, baci ointment given to pt, and cold compress  1505- Pt discharged walked out to car with this RN

## 2021-06-28 NOTE — ANESTHESIA PRE-OP
Resp: 16   Temp: 97.2 °F (36.2 °C)   SpO2: 100%     Mental Status: alert and oriented   Heart:  Regular rate and rhythm    Lungs:  Clear to ausculation   Abdomen: soft, non tender   PLANNED PROCEDURE   Bilateral upper and lower blepharoplasty   SEDATION  Planned agent:fentanyl, versed  ASA Classification: 2  Class 1: A normal healthy patient  Class 2: Pt with mild to moderate systemic disease  Class 3: Severe systemic disease or disturbance  Class 4: Severe systemic disorders that are already life threatening. Class 5: Moribund pt with little chances of survival, for more than 24 hours. Mallampati I Airway Classification: 3    1. Pre-procedure diagnostic studies complete and results available. Comment:    2. Previous sedation/anesthesia experiences assessed. Comment:    3. The patient is an appropriate candidate to undergo the planned procedure sedation and anesthesia. (Refer to nursing sedation/analgesia documentation record)  4. Formulation and discussion of sedation/procedure plan, risks, and expectations with patient and/or responsible adult completed. 5. Patient examined immediately prior to the procedure.  (Refer to nursing sedation/analgesia documentation record)    Devon Llamas MD  Electronically signed 6/28/2021 at 11:02 AM

## 2021-06-30 NOTE — OP NOTE
[address] [id number]                                OPERATIVE REPORT    PATIENT NAME: Rome Oreilly                  :        1960  MED REC NO:   [id number]                           ROOM:  ACCOUNT NO:   [de-identified]                           ADMIT DATE: 2021  PROVIDER:     Roxanna Jones M.D.    Flavio Ramesh:  2021    PREOPERATIVE DIAGNOSIS:  Unacceptable cosmetic appearance. POSTOPERATIVE DIAGNOSIS:  Unacceptable cosmetic appearance. OPERATIONS PERFORMED:  Bilateral upper and lower blepharoplasties. SURGEON:  ELYSE Jones MD    FIRST ASSISTANT:  Kumar Bender PA-C    BLOOD LOSS:  5 mL. COMPLICATIONS:  None. DESCRIPTION OF PROCEDURE:  With the patient lying in the supine position  under adequate IV sedation, total of 11 mL of 1% lidocaine with  1:100,000 epinephrine solution was used to anesthetize the upper and  lower eyelids. After waiting approximately 15 minutes, the epinephrine  effect took hold. A lateral incision was made in the inferior eyelid  and then a muscle skin flap was elevated leaving approximately 5 mm of  pretarsal orbicularis muscle in place as the subciliary incision was  directly on the ciliary margin. This was exposed, elevated, this showed  a tremendous step off in the fat herniation from the lower eyelid. Dissection was carried out to elevate the malar fat and then retraction  of the lower eyelid superiorly with a silk suture placed to and through  the tarsus. The septal reset was performed using a 6-0 silk suture  placed in a simple interrupted fashion. We tacked the septum to the  periosteum over the rim. After completing this, the patient left to do  pinch test of the upper eyelids. The upper eyelids skin was also  resected as was the medial and middle fat deposits and excess skin. Hemostasis was noted to be absolutely complete using Bovie  electrocautery.

## 2021-07-08 ENCOUNTER — HOSPITAL ENCOUNTER (OUTPATIENT)
Dept: WOMENS IMAGING | Age: 61
Discharge: HOME | End: 2021-07-08
Payer: COMMERCIAL

## 2021-07-08 DIAGNOSIS — Z12.31 VISIT FOR SCREENING MAMMOGRAM: ICD-10-CM

## 2021-07-08 PROCEDURE — 77063 BREAST TOMOSYNTHESIS BI: CPT

## 2021-08-27 ENCOUNTER — NURSE ONLY (OUTPATIENT)
Dept: LAB | Age: 61
End: 2021-08-27

## 2021-08-27 LAB
ALBUMIN SERPL-MCNC: 4.6 G/DL (ref 3.5–5.1)
ALP BLD-CCNC: 90 U/L (ref 38–126)
ALT SERPL-CCNC: 19 U/L (ref 11–66)
ANION GAP SERPL CALCULATED.3IONS-SCNC: 10 MEQ/L (ref 8–16)
AST SERPL-CCNC: 20 U/L (ref 5–40)
BASOPHILS # BLD: 1 %
BASOPHILS ABSOLUTE: 0.1 THOU/MM3 (ref 0–0.1)
BILIRUB SERPL-MCNC: 0.4 MG/DL (ref 0.3–1.2)
BUN BLDV-MCNC: 14 MG/DL (ref 7–22)
CALCIUM SERPL-MCNC: 9.6 MG/DL (ref 8.5–10.5)
CHLORIDE BLD-SCNC: 107 MEQ/L (ref 98–111)
CHOLESTEROL, TOTAL: 208 MG/DL (ref 100–199)
CO2: 25 MEQ/L (ref 23–33)
CREAT SERPL-MCNC: 0.7 MG/DL (ref 0.4–1.2)
EOSINOPHIL # BLD: 7.2 %
EOSINOPHILS ABSOLUTE: 0.5 THOU/MM3 (ref 0–0.4)
ERYTHROCYTE [DISTWIDTH] IN BLOOD BY AUTOMATED COUNT: 12.7 % (ref 11.5–14.5)
ERYTHROCYTE [DISTWIDTH] IN BLOOD BY AUTOMATED COUNT: 42.8 FL (ref 35–45)
GFR SERPL CREATININE-BSD FRML MDRD: 85 ML/MIN/1.73M2
GLUCOSE BLD-MCNC: 88 MG/DL (ref 70–108)
HCT VFR BLD CALC: 46.1 % (ref 37–47)
HDLC SERPL-MCNC: 77 MG/DL
HEMOGLOBIN: 15.3 GM/DL (ref 12–16)
IMMATURE GRANS (ABS): 0.02 THOU/MM3 (ref 0–0.07)
IMMATURE GRANULOCYTES: 0.3 %
LDL CHOLESTEROL CALCULATED: 116 MG/DL
LYMPHOCYTES # BLD: 28 %
LYMPHOCYTES ABSOLUTE: 2 THOU/MM3 (ref 1–4.8)
MCH RBC QN AUTO: 30.6 PG (ref 26–33)
MCHC RBC AUTO-ENTMCNC: 33.2 GM/DL (ref 32.2–35.5)
MCV RBC AUTO: 92.2 FL (ref 81–99)
MONOCYTES # BLD: 6.3 %
MONOCYTES ABSOLUTE: 0.5 THOU/MM3 (ref 0.4–1.3)
NUCLEATED RED BLOOD CELLS: 0 /100 WBC
PLATELET # BLD: 231 THOU/MM3 (ref 130–400)
PMV BLD AUTO: 9.9 FL (ref 9.4–12.4)
POTASSIUM SERPL-SCNC: 4.2 MEQ/L (ref 3.5–5.2)
RBC # BLD: 5 MILL/MM3 (ref 4.2–5.4)
SEG NEUTROPHILS: 57.2 %
SEGMENTED NEUTROPHILS ABSOLUTE COUNT: 4.1 THOU/MM3 (ref 1.8–7.7)
SODIUM BLD-SCNC: 142 MEQ/L (ref 135–145)
TOTAL PROTEIN: 6.7 G/DL (ref 6.1–8)
TRIGL SERPL-MCNC: 73 MG/DL (ref 0–199)
TSH SERPL DL<=0.05 MIU/L-ACNC: 2.18 UIU/ML (ref 0.4–4.2)
WBC # BLD: 7.2 THOU/MM3 (ref 4.8–10.8)

## 2021-08-31 ENCOUNTER — HOSPITAL ENCOUNTER (OUTPATIENT)
Dept: ULTRASOUND IMAGING | Age: 61
Discharge: HOME | End: 2021-08-31
Payer: COMMERCIAL

## 2021-08-31 DIAGNOSIS — E04.1 THYROID NODULE: ICD-10-CM

## 2021-08-31 PROCEDURE — 76536 US EXAM OF HEAD AND NECK: CPT

## 2022-07-06 LAB
CHOLESTEROL, TOTAL: 222 MG/DL (ref 0–199)
FASTING: YES
GLUCOSE BLD-MCNC: 91 MG/DL (ref 74–109)
HDLC SERPL-MCNC: 78 MG/DL (ref 40–90)
LDL CHOLESTEROL CALCULATED: 125 MG/DL
TRIGL SERPL-MCNC: 93 MG/DL (ref 0–199)

## 2022-07-25 ENCOUNTER — HOSPITAL ENCOUNTER (EMERGENCY)
Age: 62
Discharge: HOME | End: 2022-07-25
Attending: STUDENT IN AN ORGANIZED HEALTH CARE EDUCATION/TRAINING PROGRAM
Payer: COMMERCIAL

## 2022-07-25 ENCOUNTER — APPOINTMENT (OUTPATIENT)
Dept: GENERAL RADIOLOGY | Age: 62
End: 2022-07-25
Payer: COMMERCIAL

## 2022-07-25 ENCOUNTER — APPOINTMENT (OUTPATIENT)
Dept: INTERVENTIONAL RADIOLOGY/VASCULAR | Age: 62
End: 2022-07-25
Payer: COMMERCIAL

## 2022-07-25 VITALS
OXYGEN SATURATION: 100 % | HEART RATE: 81 BPM | SYSTOLIC BLOOD PRESSURE: 131 MMHG | TEMPERATURE: 98.2 F | RESPIRATION RATE: 17 BRPM | DIASTOLIC BLOOD PRESSURE: 85 MMHG | HEIGHT: 64 IN | BODY MASS INDEX: 29.19 KG/M2 | WEIGHT: 171 LBS

## 2022-07-25 DIAGNOSIS — M79.89 LEG SWELLING: Primary | ICD-10-CM

## 2022-07-25 LAB
ANION GAP SERPL CALCULATED.3IONS-SCNC: 12 MEQ/L (ref 8–16)
BASOPHILS # BLD: 0.9 %
BASOPHILS ABSOLUTE: 0.1 THOU/MM3 (ref 0–0.1)
BUN BLDV-MCNC: 17 MG/DL (ref 7–22)
CALCIUM SERPL-MCNC: 9.7 MG/DL (ref 8.5–10.5)
CHLORIDE BLD-SCNC: 107 MEQ/L (ref 98–111)
CO2: 22 MEQ/L (ref 23–33)
CREAT SERPL-MCNC: 0.8 MG/DL (ref 0.4–1.2)
EOSINOPHIL # BLD: 5.3 %
EOSINOPHILS ABSOLUTE: 0.5 THOU/MM3 (ref 0–0.4)
ERYTHROCYTE [DISTWIDTH] IN BLOOD BY AUTOMATED COUNT: 12.9 % (ref 11.5–14.5)
ERYTHROCYTE [DISTWIDTH] IN BLOOD BY AUTOMATED COUNT: 43.1 FL (ref 35–45)
GFR SERPL CREATININE-BSD FRML MDRD: 73 ML/MIN/1.73M2
GLUCOSE BLD-MCNC: 101 MG/DL (ref 70–108)
HCT VFR BLD CALC: 44.9 % (ref 37–47)
HEMOGLOBIN: 14.5 GM/DL (ref 12–16)
IMMATURE GRANS (ABS): 0.03 THOU/MM3 (ref 0–0.07)
IMMATURE GRANULOCYTES: 0.3 %
LYMPHOCYTES # BLD: 28.9 %
LYMPHOCYTES ABSOLUTE: 2.7 THOU/MM3 (ref 1–4.8)
MCH RBC QN AUTO: 29.4 PG (ref 26–33)
MCHC RBC AUTO-ENTMCNC: 32.3 GM/DL (ref 32.2–35.5)
MCV RBC AUTO: 91.1 FL (ref 81–99)
MONOCYTES # BLD: 5.2 %
MONOCYTES ABSOLUTE: 0.5 THOU/MM3 (ref 0.4–1.3)
NUCLEATED RED BLOOD CELLS: 0 /100 WBC
OSMOLALITY CALCULATION: 282.9 MOSMOL/KG (ref 275–300)
PLATELET # BLD: 251 THOU/MM3 (ref 130–400)
PMV BLD AUTO: 9.8 FL (ref 9.4–12.4)
POTASSIUM REFLEX MAGNESIUM: 4 MEQ/L (ref 3.5–5.2)
PRO-BNP: 32.4 PG/ML (ref 0–900)
RBC # BLD: 4.93 MILL/MM3 (ref 4.2–5.4)
SEG NEUTROPHILS: 59.4 %
SEGMENTED NEUTROPHILS ABSOLUTE COUNT: 5.5 THOU/MM3 (ref 1.8–7.7)
SODIUM BLD-SCNC: 141 MEQ/L (ref 135–145)
TROPONIN T: < 0.01 NG/ML
WBC # BLD: 9.3 THOU/MM3 (ref 4.8–10.8)

## 2022-07-25 PROCEDURE — 93971 EXTREMITY STUDY: CPT

## 2022-07-25 PROCEDURE — 99285 EMERGENCY DEPT VISIT HI MDM: CPT

## 2022-07-25 PROCEDURE — 84484 ASSAY OF TROPONIN QUANT: CPT

## 2022-07-25 PROCEDURE — 83880 ASSAY OF NATRIURETIC PEPTIDE: CPT

## 2022-07-25 PROCEDURE — 71046 X-RAY EXAM CHEST 2 VIEWS: CPT

## 2022-07-25 PROCEDURE — 80048 BASIC METABOLIC PNL TOTAL CA: CPT

## 2022-07-25 PROCEDURE — 93005 ELECTROCARDIOGRAM TRACING: CPT | Performed by: STUDENT IN AN ORGANIZED HEALTH CARE EDUCATION/TRAINING PROGRAM

## 2022-07-25 PROCEDURE — 36415 COLL VENOUS BLD VENIPUNCTURE: CPT

## 2022-07-25 PROCEDURE — 85025 COMPLETE CBC W/AUTO DIFF WBC: CPT

## 2022-07-25 ASSESSMENT — ENCOUNTER SYMPTOMS
DIARRHEA: 0
PHOTOPHOBIA: 0
SHORTNESS OF BREATH: 0
ABDOMINAL PAIN: 0
VOMITING: 0
COUGH: 0
CONSTIPATION: 1
SINUS PAIN: 0
BACK PAIN: 1
SORE THROAT: 0
NAUSEA: 0

## 2022-07-25 ASSESSMENT — PAIN SCALES - GENERAL: PAINLEVEL_OUTOF10: 3

## 2022-07-25 ASSESSMENT — PAIN DESCRIPTION - FREQUENCY: FREQUENCY: CONTINUOUS

## 2022-07-25 ASSESSMENT — PAIN - FUNCTIONAL ASSESSMENT: PAIN_FUNCTIONAL_ASSESSMENT: 0-10

## 2022-07-25 ASSESSMENT — PAIN DESCRIPTION - PAIN TYPE: TYPE: ACUTE PAIN

## 2022-07-26 LAB
EKG ATRIAL RATE: 75 BPM
EKG P AXIS: 66 DEGREES
EKG P-R INTERVAL: 144 MS
EKG Q-T INTERVAL: 384 MS
EKG QRS DURATION: 90 MS
EKG QTC CALCULATION (BAZETT): 428 MS
EKG R AXIS: -2 DEGREES
EKG T AXIS: 66 DEGREES
EKG VENTRICULAR RATE: 75 BPM

## 2022-07-26 PROCEDURE — 93010 ELECTROCARDIOGRAM REPORT: CPT | Performed by: INTERNAL MEDICINE

## 2022-07-26 NOTE — ED PROVIDER NOTES
[place] ENCOUNTER          Pt Name: Lucy Hernandez  MRN: [id number]  [place] 1960  Date of evaluation: 7/25/2022  Treating Resident Physician: Kimi Mckee MD  Supervising Physician: Dr. Jai Desai    History obtained from chart review and the patient. CHIEF COMPLAINT       Chief Complaint   Patient presents with    Leg Swelling           HISTORY OF PRESENT ILLNESS    HPI  Lucy Hernandez is a 65 y.o. female who presents to the emergency department for evaluation of leg swelling. Patient states that she had a 9-hour car ride to [place] this weekend, when she noticed that she had swelling in her left leg. She also endorses pain in the area behind her knee. She had 2 episodes of sharp left-sided chest pain that lasted approximately 1 minute, 2 days apart. No cardiac history. Denies shortness of breath, chest pain at the moment, palpitations, headache, vision changes, and abdominal pain. History is significant for an SAH. Only medications at home are topiramate and prilosec. The patient has no other acute complaints at this time. REVIEW OF SYSTEMS   Review of Systems   Constitutional:  Negative for chills, diaphoresis, fatigue and fever. HENT:  Negative for congestion, sinus pain and sore throat. Eyes:  Negative for photophobia and visual disturbance. Respiratory:  Negative for cough and shortness of breath. Cardiovascular:  Positive for chest pain (2 episodes) and leg swelling (L>R). Negative for palpitations. Gastrointestinal:  Positive for constipation. Negative for abdominal pain, diarrhea, nausea and vomiting. Genitourinary:  Negative for flank pain and hematuria. Musculoskeletal:  Positive for back pain. Negative for neck pain and neck stiffness. Skin:  Negative for pallor, rash and wound.    Neurological:  Negative for dizziness, tremors, seizures, syncope, weakness, light-headedness, numbness and headaches. Psychiatric/Behavioral:  Negative for hallucinations and suicidal ideas. The patient is not nervous/anxious. PAST MEDICAL AND SURGICAL HISTORY     Past Medical History:   Diagnosis Date    Arthritis     Cerebral artery occlusion with cerebral infarction [facility])     GERD (gastroesophageal reflux disease)     Subarachnoid hemorrhage ([facility] [address] 2016     Past Surgical History:   Procedure Laterality Date    BLEPHAROPLASTY Bilateral 2021    BILATERAL UPPER AND LOWER BLEPHAROPLASTY performed by Catina Toth MD at [facility], DIAGNOSTIC      SD SONO GUIDE NEEDLE BIOPSY      TUBAL LIGATION           MEDICATIONS   No current facility-administered medications for this encounter.     Current Outpatient Medications:     topiramate (TOPAMAX) 25 MG tablet, Take 50 mg by mouth daily, Disp: , Rfl:     lamoTRIgine (LAMICTAL) 150 MG tablet, TAKE 1 TABLET BY MOUTH ONE TIME A DAY, Disp: 90 tablet, Rfl: 0    FLUoxetine (PROZAC) 20 MG capsule, Take 1 capsule by mouth daily, Disp: 90 capsule, Rfl: 1    b complex vitamins capsule, Take 1 capsule by mouth daily, Disp: , Rfl:     CALCIUM-MAGNESIUM-VITAMIN D PO, Take by mouth, Disp: , Rfl:     Multiple Vitamin (MULTI-VITAMIN DAILY PO), Take by mouth, Disp: , Rfl:     omeprazole (PRILOSEC) 20 MG delayed release capsule, Take 20 mg by mouth daily, Disp: , Rfl:       SOCIAL HISTORY     Social History     Social History Narrative    ** Merged History Encounter **          Social History     Tobacco Use    Smoking status: Former     Packs/day: 0.50     Years: 25.00     Pack years: 12.50     Types: Cigarettes     Quit date: 3/7/2009     Years since quittin.3    Smokeless tobacco: Never   Vaping Use    Vaping Use: Never used   Substance Use Topics    Alcohol use: Yes     Comment: occas    Drug use: No         ALLERGIES     Allergies   Allergen Reactions    Ciprofloxacin Rash    Keflex [Cephalexin] Rash     Many years ago FAMILY HISTORY     Family History   Problem Relation Age of Onset    Arthritis Mother     Diabetes Father     Cancer Father         lung-smoker    Breast Cancer Maternal Grandmother 72         PREVIOUS RECORDS   Previous records reviewed:  Neurology visit 10/10/2019 . PHYSICAL EXAM     ED Triage Vitals [07/25/22 2006]   BP Temp Temp Source Heart Rate Resp SpO2 Height Weight   131/85 98.2 °F (36.8 °C) Oral 81 17 100 % 5' 4\" (1.626 m) 171 lb (77.6 kg)     Initial vital signs and nursing assessment reviewed and normal. Body mass index is 29.35 kg/m². Pulsoximetry is normal per my interpretation. Additional Vital Signs:  Vitals:    07/25/22 2006   BP: 131/85   Pulse: 81   Resp: 17   Temp: 98.2 °F (36.8 °C)   SpO2: 100%       Physical Exam  Constitutional:       General: She is not in acute distress. Appearance: She is normal weight. She is not diaphoretic. HENT:      Head: Normocephalic and atraumatic. Right Ear: External ear normal.      Left Ear: External ear normal.      Mouth/Throat:      Mouth: Mucous membranes are moist.      Pharynx: No posterior oropharyngeal erythema. Eyes:      General: No scleral icterus. Right eye: No discharge. Left eye: No discharge. Extraocular Movements: Extraocular movements intact. Pupils: Pupils are equal, round, and reactive to light. Cardiovascular:      Rate and Rhythm: Normal rate. Pulses: Normal pulses. Heart sounds: Normal heart sounds. No murmur heard. No friction rub. No gallop. Pulmonary:      Effort: Pulmonary effort is normal.      Breath sounds: Normal breath sounds. No stridor. No wheezing, rhonchi or rales. Chest:      Chest wall: No tenderness. Abdominal:      General: Abdomen is flat. Palpations: Abdomen is soft. Tenderness: There is no abdominal tenderness. There is no right CVA tenderness, left CVA tenderness, guarding or rebound.    Musculoskeletal:         General: Tenderness (Left calf) present. Right lower leg: Edema (less than left) present. Left lower leg: Edema (1+ pitting) present. Skin:     General: Skin is warm. Capillary Refill: Capillary refill takes less than 2 seconds. Findings: No rash. Neurological:      General: No focal deficit present. Mental Status: She is alert and oriented to person, place, and time. Motor: No weakness. Gait: Gait normal.   Psychiatric:         Mood and Affect: Mood normal.         Behavior: Behavior normal.         Thought Content: Thought content normal.         Judgment: Judgment normal.           MEDICAL DECISION MAKING   Initial Assessment:   65 y.o. female with PMHx of SAH presenting with left leg swelling with history of travel this weekend. Left calf tender to palpation. Not hypoxic or tachycardic, less concerning for PE, but did have 2 episodes of sharp chest pain. History is most concerning for DVT however: Working differential diagnosis includes but is not limited to:  DVT, CHF, PE, venous stasis  Plan:   Cardiac workup: EKG, troponin, BNP, CXR  Labs: CBC, BMP  Imaging: Left lower extremity venous ultrasound        ED RESULTS   Laboratory results:  Labs Reviewed   CBC WITH AUTO DIFFERENTIAL - Abnormal; Notable for the following components:       Result Value    Eosinophils Absolute 0.5 (*)     All other components within normal limits   BASIC METABOLIC PANEL W/ REFLEX TO MG FOR LOW K - Abnormal; Notable for the following components:    CO2 22 (*)     All other components within normal limits   GLOMERULAR FILTRATION RATE, ESTIMATED - Abnormal; Notable for the following components:    Est, Glom Filt Rate 73 (*)     All other components within normal limits   BRAIN NATRIURETIC PEPTIDE   TROPONIN   ANION GAP   OSMOLALITY       Radiologic studies results:  VL DUP LOWER EXTREMITY VENOUS LEFT   Final Result   No DVT seen in the left lower extremity.       This document has been electronically signed by: Colt Manzanares MD on    07/25/2022 10:32 PM      Technique Used: Duplex examination performed utilizing grayscale, color    and spectral analysis. XR CHEST (2 VW)   Final Result   No acute findings. This document has been electronically signed by: Colt Manzanares MD on    07/25/2022 09:45 PM          ED Medications administered this visit: Medications - No data to display      ED COURSE        Patient remains hemodynamically stable throughout her stay in the ED. Her cardiac work-up was unremarkable. Ultrasound of the left leg showed no blood clot. Her labs and imaging were unremarkable. Patient was instructed to wear compression stockings and elevate the legs to help with the swelling. She was also instructed to follow-up with her primary care provider and return to the emergency department if she develops shortness of breath, worsening leg pain, palpitations, worsening chest pain, or fever. Patient agreed with these strict return precautions and follow up instructions. MEDICATION CHANGES     Current Discharge Medication List            FINAL DISPOSITION     Final diagnoses:   Leg swelling     Condition: condition: stable  Dispo: Discharge to home      This transcription was electronically signed. Parts of this transcriptions may have been dictated by use of voice recognition software and electronically transcribed, and parts may have been transcribed with the assistance of an ED scribe. The transcription may contain errors not detected in proofreading. Please refer to my supervising physician's documentation if my documentation differs.     Electronically Signed: Tiff Montgomery MD, 07/25/22, 10:38 Srikanth Powell MD  Resident  07/25/22 0602

## 2022-07-26 NOTE — DISCHARGE INSTRUCTIONS
You were seen in the emergency department for leg swelling. Work-up for blood clot or cardiac cause was negative. Please return to the emergency department if you develop shortness of breath, palpitations, chest pain, or fever. Otherwise, follow-up with your primary care provider.

## 2022-11-08 ENCOUNTER — HOSPITAL ENCOUNTER (OUTPATIENT)
Dept: WOMENS IMAGING | Age: 62
Discharge: HOME | End: 2022-11-08
Payer: COMMERCIAL

## 2022-11-08 DIAGNOSIS — Z12.31 VISIT FOR SCREENING MAMMOGRAM: ICD-10-CM

## 2022-11-08 PROCEDURE — 77067 SCR MAMMO BI INCL CAD: CPT

## 2022-12-28 ENCOUNTER — HOSPITAL ENCOUNTER (OUTPATIENT)
Dept: GENERAL RADIOLOGY | Age: 62
Discharge: HOME | End: 2022-12-28
Payer: COMMERCIAL

## 2022-12-28 ENCOUNTER — HOSPITAL ENCOUNTER (OUTPATIENT)
Age: 62
Discharge: HOME | End: 2022-12-28
Payer: COMMERCIAL

## 2022-12-28 DIAGNOSIS — M79.604 RIGHT LEG PAIN: ICD-10-CM

## 2022-12-28 LAB
BASOPHILS # BLD: 1.1 %
BASOPHILS ABSOLUTE: 0.1 THOU/MM3 (ref 0–0.1)
C-REACTIVE PROTEIN: < 0.3 MG/DL (ref 0–1)
EOSINOPHIL # BLD: 5.6 %
EOSINOPHILS ABSOLUTE: 0.4 THOU/MM3 (ref 0–0.4)
ERYTHROCYTE [DISTWIDTH] IN BLOOD BY AUTOMATED COUNT: 12.6 % (ref 11.5–14.5)
ERYTHROCYTE [DISTWIDTH] IN BLOOD BY AUTOMATED COUNT: 41.8 FL (ref 35–45)
HCT VFR BLD CALC: 43.6 % (ref 37–47)
HEMOGLOBIN: 14.2 GM/DL (ref 12–16)
IMMATURE GRANS (ABS): 0.01 THOU/MM3 (ref 0–0.07)
IMMATURE GRANULOCYTES: 0.2 %
LYMPHOCYTES # BLD: 26.2 %
LYMPHOCYTES ABSOLUTE: 1.7 THOU/MM3 (ref 1–4.8)
MCH RBC QN AUTO: 29.8 PG (ref 26–33)
MCHC RBC AUTO-ENTMCNC: 32.6 GM/DL (ref 32.2–35.5)
MCV RBC AUTO: 91.4 FL (ref 81–99)
MONOCYTES # BLD: 5.7 %
MONOCYTES ABSOLUTE: 0.4 THOU/MM3 (ref 0.4–1.3)
NUCLEATED RED BLOOD CELLS: 0 /100 WBC
PLATELET # BLD: 254 THOU/MM3 (ref 130–400)
PMV BLD AUTO: 9.4 FL (ref 9.4–12.4)
RBC # BLD: 4.77 MILL/MM3 (ref 4.2–5.4)
SEDIMENTATION RATE, ERYTHROCYTE: 6 MM/HR (ref 0–20)
SEG NEUTROPHILS: 61.2 %
SEGMENTED NEUTROPHILS ABSOLUTE COUNT: 3.9 THOU/MM3 (ref 1.8–7.7)
WBC # BLD: 6.3 THOU/MM3 (ref 4.8–10.8)

## 2022-12-28 PROCEDURE — 36415 COLL VENOUS BLD VENIPUNCTURE: CPT

## 2022-12-28 PROCEDURE — 85651 RBC SED RATE NONAUTOMATED: CPT

## 2022-12-28 PROCEDURE — 73590 X-RAY EXAM OF LOWER LEG: CPT

## 2022-12-28 PROCEDURE — 86140 C-REACTIVE PROTEIN: CPT

## 2022-12-28 PROCEDURE — 85025 COMPLETE CBC W/AUTO DIFF WBC: CPT

## 2022-12-28 PROCEDURE — 73630 X-RAY EXAM OF FOOT: CPT

## 2022-12-28 PROCEDURE — 73610 X-RAY EXAM OF ANKLE: CPT

## 2023-01-31 ENCOUNTER — HOSPITAL ENCOUNTER (EMERGENCY)
Age: 63
Discharge: HOME | End: 2023-01-31
Payer: COMMERCIAL

## 2023-01-31 VITALS
BODY MASS INDEX: 30.9 KG/M2 | RESPIRATION RATE: 16 BRPM | OXYGEN SATURATION: 98 % | SYSTOLIC BLOOD PRESSURE: 143 MMHG | WEIGHT: 180 LBS | HEART RATE: 86 BPM | TEMPERATURE: 97.5 F | DIASTOLIC BLOOD PRESSURE: 84 MMHG

## 2023-01-31 DIAGNOSIS — K59.00 CONSTIPATION, UNSPECIFIED CONSTIPATION TYPE: ICD-10-CM

## 2023-01-31 DIAGNOSIS — R10.32 ABDOMINAL PAIN, LEFT LOWER QUADRANT: Primary | ICD-10-CM

## 2023-01-31 LAB
BILIRUB UR STRIP.AUTO-MCNC: NEGATIVE MG/DL
CHARACTER UR: CLEAR
COLOR: YELLOW
GLUCOSE UR QL STRIP.AUTO: NEGATIVE MG/DL
KETONES UR QL STRIP.AUTO: NEGATIVE
NITRITE UR QL STRIP.AUTO: NEGATIVE
PH UR STRIP.AUTO: 6 [PH] (ref 5–9)
PROT UR STRIP.AUTO-MCNC: NEGATIVE MG/DL
RBC #/AREA URNS HPF: NEGATIVE /[HPF]
SP GR UR STRIP.AUTO: 1.02 (ref 1–1.03)
UROBILINOGEN, URINE: 0.2 EU/DL (ref 0.2–1)
WBC #/AREA URNS HPF: NEGATIVE /[HPF]

## 2023-01-31 PROCEDURE — 99203 OFFICE O/P NEW LOW 30 MIN: CPT | Performed by: NURSE PRACTITIONER

## 2023-01-31 PROCEDURE — 99213 OFFICE O/P EST LOW 20 MIN: CPT

## 2023-01-31 PROCEDURE — 81003 URINALYSIS AUTO W/O SCOPE: CPT

## 2023-01-31 RX ORDER — BUPROPION HYDROCHLORIDE 150 MG/1
TABLET ORAL
COMMUNITY
Start: 2023-01-17

## 2023-01-31 RX ORDER — POLYETHYLENE GLYCOL 3350 17 G/17G
17 POWDER, FOR SOLUTION ORAL DAILY
Qty: 1530 G | Refills: 0 | Status: SHIPPED | OUTPATIENT
Start: 2023-01-31 | End: 2023-03-02

## 2023-01-31 ASSESSMENT — ENCOUNTER SYMPTOMS
SHORTNESS OF BREATH: 0
VOMITING: 0
SORE THROAT: 0
COUGH: 0
NAUSEA: 0
CHEST TIGHTNESS: 0
ABDOMINAL PAIN: 1
RHINORRHEA: 0
DIARRHEA: 0

## 2023-01-31 ASSESSMENT — PAIN DESCRIPTION - LOCATION: LOCATION: ABDOMEN

## 2023-01-31 ASSESSMENT — PAIN SCALES - GENERAL: PAINLEVEL_OUTOF10: 1

## 2023-01-31 ASSESSMENT — PAIN - FUNCTIONAL ASSESSMENT: PAIN_FUNCTIONAL_ASSESSMENT: 0-10

## 2023-01-31 NOTE — ED TRIAGE NOTES
Leigh Conti arrives to room with complaint of  left lower abd pain  symptoms started 5 days ago and worsening. Pain seems to worsen at night. Denies urinary frequency, urgency, or pain with urination.

## 2023-01-31 NOTE — ED PROVIDER NOTES
[address]  Urgent Care Encounter       CHIEF COMPLAINT       Chief Complaint   Patient presents with    Abdominal Pain       Nurses Notes reviewed and I agree except as noted in the HPI. HISTORY OF PRESENT ILLNESS   Karley Dietz is a 65 y.o. female who presents to the [facility] care center for evaluation of left lower quadrant abdominal pain. She reports this pain started roughly 4 to 5 days ago. She reports that it is mild. She reports that it is a dull ache and mildly worsened at night. She reports at night at its worst is 4 out of 10. She does report history of constipation, but that she is not taking any over-the-counter medications to relieve her constipation. She reports last bowel movement yesterday, and normal for self. Denies dysuria, urgency, or frequency. Denies history of previous abdominal surgeries. The history is provided by the patient. No  was used. REVIEW OF SYSTEMS     Review of Systems   Constitutional:  Negative for activity change, appetite change, chills, fatigue and fever. HENT:  Negative for ear discharge, ear pain, rhinorrhea and sore throat. Respiratory:  Negative for cough, chest tightness and shortness of breath. Cardiovascular:  Negative for chest pain. Gastrointestinal:  Positive for abdominal pain. Negative for diarrhea, nausea and vomiting. Genitourinary:  Negative for dysuria. Skin:  Negative for rash. Allergic/Immunologic: Negative for environmental allergies and food allergies. Neurological:  Negative for dizziness and headaches. PAST MEDICAL HISTORY         Diagnosis Date    Arthritis     Cerebral artery occlusion with cerebral infarction [facility])     GERD (gastroesophageal reflux disease)     [facility]       SURGICALHISTORY     Patient  has a past surgical history that includes Colonoscopy; Endoscopy, colon, diagnostic;  Tubal ligation; chg us guidance needle placement img s&i; and blepharoplasty (Bilateral, 6/28/2021). CURRENT MEDICATIONS       Discharge Medication List as of 1/31/2023  5:49 PM        CONTINUE these medications which have NOT CHANGED    Details   buPROPion (WELLBUTRIN XL) 150 MG extended release tablet Historical Med      FLUoxetine (PROZAC) 20 MG capsule Take 1 capsule by mouth daily, Disp-90 capsule,R-1Normal      b complex vitamins capsule Take 1 capsule by mouth dailyHistorical Med      CALCIUM-MAGNESIUM-VITAMIN D PO Take by mouthHistorical Med      Multiple Vitamin (MULTI-VITAMIN DAILY PO) Take by mouthHistorical Med      omeprazole (PRILOSEC) 20 MG delayed release capsule Take 20 mg by mouth dailyHistorical Med             ALLERGIES     Patient is is allergic to ciprofloxacin and keflex [cephalexin]. Patients   Immunization History   Administered Date(s) Administered    Influenza Virus Vaccine 10/26/2017, 10/24/2019, 10/28/2021       FAMILY HISTORY     Patient's family history includes Arthritis in her mother; Breast Cancer (age of onset: 72) in her maternal grandmother; Cancer in her father; Diabetes in her father; Uterine Cancer in her maternal grandmother. SOCIAL HISTORY     Patient  reports that she quit smoking about 13 years ago. Her smoking use included cigarettes. She has a 13.00 pack-year smoking history. She has never used smokeless tobacco. She reports current alcohol use. She reports that she does not use drugs. PHYSICAL EXAM     ED TRIAGE VITALS  BP: (!) 143/84, Temp: 97.5 °F (36.4 °C), Heart Rate: 86, Resp: 16, SpO2: 98 %,Estimated body mass index is 30.9 kg/m² as calculated from the following:    Height as of 7/25/22: 5' 4\" (1.626 m). Weight as of this encounter: 180 lb (81.6 kg). ,No LMP recorded. Patient is postmenopausal.    Physical Exam  Vitals and nursing note reviewed. Constitutional:       General: She is not in acute distress. Appearance: Normal appearance.  She is not ill-appearing, toxic-appearing or diaphoretic. HENT:      Head: Normocephalic. Right Ear: Ear canal and external ear normal.      Left Ear: Ear canal and external ear normal.      Nose: Nose normal. No congestion or rhinorrhea. Mouth/Throat:      Mouth: Mucous membranes are moist.      Pharynx: Oropharynx is clear. No oropharyngeal exudate or posterior oropharyngeal erythema. Cardiovascular:      Rate and Rhythm: Normal rate. Pulses: Normal pulses. Pulmonary:      Effort: Pulmonary effort is normal. No respiratory distress. Breath sounds: No stridor. No wheezing or rhonchi. Abdominal:      General: Abdomen is flat. Bowel sounds are normal.      Palpations: Abdomen is soft. Tenderness: There is abdominal tenderness in the left lower quadrant. Musculoskeletal:         General: No swelling or tenderness. Normal range of motion. Cervical back: Normal range of motion. Neurological:      General: No focal deficit present. Mental Status: She is alert and oriented to person, place, and time.    Psychiatric:         Mood and Affect: Mood normal.         Behavior: Behavior normal.       DIAGNOSTIC RESULTS     Labs:  Results for orders placed or performed during the hospital encounter of 01/31/23   Urinalysis   Result Value Ref Range    Glucose, Ur Negative NEGATIVE mg/dl    Bilirubin Urine Negative NEGATIVE    Ketones, Urine Negative NEGATIVE    Specific Gravity, Urine 1.020 1.002 - 1.030    Blood, Urine Negative NEGATIVE    pH, UA 6.00 5.0 - 9.0    Protein, UA Negative NEGATIVE mg/dl    Urobilinogen, Urine 0.20 0.2 - 1.0 eu/dl    Nitrite, Urine Negative NEGATIVE    Leukocyte Esterase, Urine Negative NEGATIVE    Color, UA Yellow STRAW-YELLOW    Character, Urine Clear CLEAR-SL CLOUD       IMAGING:    No orders to display         EKG: None      URGENT CARE COURSE:     Vitals:    01/31/23 1722   BP: (!) 143/84   Pulse: 86   Resp: 16   Temp: 97.5 °F (36.4 °C)   TempSrc: Temporal   SpO2: 98%   Weight: 180 lb (81.6 kg) Medications - No data to display         PROCEDURES:  None    FINAL IMPRESSION      1. Abdominal pain, left lower quadrant    2. Constipation, unspecified constipation type          DISPOSITION/ PLAN     Patient seen and evaluated for abdominal pain. I did discuss with patient that her abdominal pain is likely related to constipation. She is prescribed MiraLAX daily, and discontinue for loose stools. She is instructed to present to the emergency department for worsening abdominal pain. She is instructed to follow-up with her PCP for continued pain. Instructed is over-the-counter Tylenol and Motrin for pain or fever. She is agreeable with the above plan and denies questions or concerns at this time.       PATIENT REFERRED TO:  DO Patsy Randolph [facility] [address] / [address] [address]      DISCHARGE MEDICATIONS:  Discharge Medication List as of 1/31/2023  5:49 PM        START taking these medications    Details   polyethylene glycol (GLYCOLAX) 17 GM/SCOOP powder Take 17 g by mouth daily, Disp-1530 g, R-0Normal             Discharge Medication List as of 1/31/2023  5:49 PM        STOP taking these medications       topiramate (TOPAMAX) 25 MG tablet Comments:   Reason for Stopping:         lamoTRIgine (LAMICTAL) 150 MG tablet Comments:   Reason for Stopping:               Discharge Medication List as of 1/31/2023  5:49 PM          MARY ALICE Campbell CNP    (Please note that portions of this note were completed with a voice recognition program. Efforts were made to edit the dictations but occasionally words are mis-transcribed.)           MARY ALICE Campbell CNP  01/31/23 5106

## 2023-06-06 LAB
CHOLEST SERPL-MCNC: 220 MG/DL (ref 0–199)
FASTING: YES
GLUCOSE SERPL-MCNC: 86 MG/DL (ref 74–109)
HDLC SERPL-MCNC: 74 MG/DL (ref 40–90)
LDLC SERPL CALC-MCNC: 130 MG/DL
TRIGL SERPL-MCNC: 79 MG/DL (ref 0–199)

## 2023-08-17 ENCOUNTER — HOSPITAL ENCOUNTER (OUTPATIENT)
Dept: ULTRASOUND IMAGING | Age: 63
Discharge: HOME | End: 2023-08-17
Payer: COMMERCIAL

## 2023-08-17 DIAGNOSIS — E04.1 THYROID NODULE: ICD-10-CM

## 2023-08-17 PROCEDURE — 76536 US EXAM OF HEAD AND NECK: CPT

## 2023-09-26 ENCOUNTER — NURSE ONLY (OUTPATIENT)
Dept: LAB | Age: 63
End: 2023-09-26

## 2023-09-26 ENCOUNTER — OFFICE VISIT (OUTPATIENT)
Dept: ENT CLINIC | Age: 63
End: 2023-09-26
Payer: COMMERCIAL

## 2023-09-26 VITALS
SYSTOLIC BLOOD PRESSURE: 122 MMHG | RESPIRATION RATE: 20 BRPM | DIASTOLIC BLOOD PRESSURE: 78 MMHG | OXYGEN SATURATION: 99 % | TEMPERATURE: 97.4 F | HEIGHT: 64 IN | BODY MASS INDEX: 31.21 KG/M2 | WEIGHT: 182.8 LBS | HEART RATE: 79 BPM

## 2023-09-26 DIAGNOSIS — E04.2 MULTIPLE THYROID NODULES: ICD-10-CM

## 2023-09-26 DIAGNOSIS — E04.2 MULTIPLE THYROID NODULES: Primary | ICD-10-CM

## 2023-09-26 LAB
T4 FREE SERPL-MCNC: 1.46 NG/DL (ref 0.93–1.76)
TSH SERPL DL<=0.005 MIU/L-ACNC: 1.64 UIU/ML (ref 0.4–4.2)

## 2023-09-26 PROCEDURE — 99204 OFFICE O/P NEW MOD 45 MIN: CPT | Performed by: PHYSICIAN ASSISTANT

## 2023-09-26 NOTE — PROGRESS NOTES
[facility] EAR, NOSE AND THROAT  [address]  Dept: [phone]  Dept Fax: [phone]  Loc: [phone]    Scar Sims is a 59 y.o. female who was referred by Nhi Peace NP for:  Chief Complaint   Patient presents with    New Patient     New patient is here for thyroid nodules. Patient had an [address] Thyroid 08/18/23. Patient needs FNA contacted [facility] office and was told he would not order one for patient. Patient said she has had thyroid issues since [id number]. Patient has developed a cough late winter spring and that's what prompted her to have test redone. Lionel Kaur HPI:     Scar Sims presents today for evaluation of thyroid nodules. The patient has a known history of thyroid nodules since at least 2018 and has been monitored by serial ultrasounds. She underwent FNA in 2018 that was benign, but on recent US was recommended to undergo FNA due to nodule being upgraded to TI-RADS 5. She denies any sensation of growth of her thyroid. She reports that she experiences intermittent throat clearing, which actually reminded her that she needed her thyroid recheck. She denies dysphagia, changes, invoice, weight loss, loss of appetite, fevers, chills, night sweats, sore throat, hemoptysis. She denies a family history of thyroid cancer. Intermittent throat clearing, reminded her to get US completed. Subjective:      REVIEW OF SYSTEMS:    Pertinent positives as noted in the HPI. All other systems reviewed and negative.     ALLERGIES:  Ciprofloxacin and Keflex [cephalexin]    Past Medical History:  Past Medical History:   Diagnosis Date    Arthritis     Cerebral artery occlusion with cerebral infarction ([address])     GERD (gastroesophageal reflux disease)     Subarachnoid hemorrhage ([address]) 2016       PSM:  Past Surgical History:   Procedure Laterality Date    BLEPHAROPLASTY Bilateral 6/28/2021    BILATERAL UPPER AND

## 2023-09-28 LAB — THYROGLOBULIN: NORMAL

## 2023-10-03 ENCOUNTER — OFFICE VISIT (OUTPATIENT)
Dept: FAMILY MEDICINE CLINIC | Age: 63
End: 2023-10-03
Payer: COMMERCIAL

## 2023-10-03 VITALS
OXYGEN SATURATION: 96 % | BODY MASS INDEX: 31.69 KG/M2 | HEIGHT: 64 IN | HEART RATE: 71 BPM | SYSTOLIC BLOOD PRESSURE: 128 MMHG | TEMPERATURE: 97.9 F | WEIGHT: 185.6 LBS | RESPIRATION RATE: 16 BRPM | DIASTOLIC BLOOD PRESSURE: 78 MMHG

## 2023-10-03 DIAGNOSIS — Z00.00 ENCOUNTER FOR MEDICAL EXAMINATION TO ESTABLISH CARE: Primary | ICD-10-CM

## 2023-10-03 DIAGNOSIS — K21.9 GASTROESOPHAGEAL REFLUX DISEASE WITHOUT ESOPHAGITIS: ICD-10-CM

## 2023-10-03 DIAGNOSIS — E78.5 DYSLIPIDEMIA: ICD-10-CM

## 2023-10-03 DIAGNOSIS — F41.9 ANXIETY: ICD-10-CM

## 2023-10-03 DIAGNOSIS — F39 MOOD DISORDER (HCC): ICD-10-CM

## 2023-10-03 DIAGNOSIS — Z86.73 HISTORY OF STROKE: ICD-10-CM

## 2023-10-03 PROCEDURE — 99204 OFFICE O/P NEW MOD 45 MIN: CPT | Performed by: NURSE PRACTITIONER

## 2023-10-03 RX ORDER — BUSPIRONE HYDROCHLORIDE 7.5 MG/1
7.5 TABLET ORAL 2 TIMES DAILY
Qty: 60 TABLET | Refills: 2 | Status: SHIPPED | OUTPATIENT
Start: 2023-10-03 | End: 2023-11-02

## 2023-10-03 RX ORDER — ATORVASTATIN CALCIUM 20 MG/1
20 TABLET, FILM COATED ORAL DAILY
Qty: 90 TABLET | Refills: 4 | Status: SHIPPED | OUTPATIENT
Start: 2023-10-03

## 2023-10-03 RX ORDER — BUPROPION HYDROCHLORIDE 300 MG/1
300 TABLET ORAL EVERY MORNING
Qty: 90 TABLET | Refills: 4 | Status: SHIPPED | OUTPATIENT
Start: 2023-10-03

## 2023-10-03 SDOH — ECONOMIC STABILITY: HOUSING INSECURITY
IN THE LAST 12 MONTHS, WAS THERE A TIME WHEN YOU DID NOT HAVE A STEADY PLACE TO SLEEP OR SLEPT IN A SHELTER (INCLUDING NOW)?: NO

## 2023-10-03 SDOH — ECONOMIC STABILITY: INCOME INSECURITY: HOW HARD IS IT FOR YOU TO PAY FOR THE VERY BASICS LIKE FOOD, HOUSING, MEDICAL CARE, AND HEATING?: NOT HARD AT ALL

## 2023-10-03 SDOH — ECONOMIC STABILITY: FOOD INSECURITY: WITHIN THE PAST 12 MONTHS, YOU WORRIED THAT YOUR FOOD WOULD RUN OUT BEFORE YOU GOT MONEY TO BUY MORE.: NEVER TRUE

## 2023-10-03 SDOH — ECONOMIC STABILITY: FOOD INSECURITY: WITHIN THE PAST 12 MONTHS, THE FOOD YOU BOUGHT JUST DIDN'T LAST AND YOU DIDN'T HAVE MONEY TO GET MORE.: NEVER TRUE

## 2023-10-03 ASSESSMENT — PATIENT HEALTH QUESTIONNAIRE - PHQ9
1. LITTLE INTEREST OR PLEASURE IN DOING THINGS: 0
SUM OF ALL RESPONSES TO PHQ QUESTIONS 1-9: 1
2. FEELING DOWN, DEPRESSED OR HOPELESS: 1
SUM OF ALL RESPONSES TO PHQ9 QUESTIONS 1 & 2: 1
SUM OF ALL RESPONSES TO PHQ QUESTIONS 1-9: 1

## 2023-10-03 NOTE — PROGRESS NOTES
Chief Complaint   Patient presents with    New Patient     No concerns/medication management     Health Maintenance     No recent pap       History obtained from the patient. SUBJECTIVE:  Terrance Ragsdale is a 59 y.o. female that presents today for establishing care with new physician, etc. New patient, 1st time visit to [facility] @ [address] [place] [address]. Pt here to establish care. Pt with history of SAH and stroke post angiogram in 2016. Has followed with neurology for 1 yr post episode   Pt denies chronic headaches does have some memory fog and some intermittent skin sensation disturbance on her left side since the stroke. Had been on a statin post stroke but stopped. Past history of tobacco abuse, will restart lipitor at 20 mg nightly     Had some anxiety and mood changes has been taking wellbutrin 150 mg XL daily which has helped but feels anxiety continues does get short fused at times and will feel anxious. No panic attacks, will increase wellbutrin and add buspar    Admits to an increase in weight recently and feeling hungry all of the time. No formal exercise program, does not count calories. History of thyroid nodule following with ENT for this has a FNA coming up    GERD controlled with prilosec bid, sees GI  Has had an EGD in past and colonoscopy will get reports.     Lab Results   Component Value Date    CHOL 220 (H) 06/06/2023    CHOL 222 (H) 07/06/2022    CHOL 208 (H) 08/27/2021     Lab Results   Component Value Date    TRIG 79 06/06/2023    TRIG 93 07/06/2022    TRIG 73 08/27/2021     Lab Results   Component Value Date    HDL 74 06/06/2023    HDL 78 07/06/2022    HDL 77 08/27/2021     Lab Results   Component Value Date    LDLCHOLESTEROL 80 10/26/2016    LDLCALC 130 06/06/2023    LDLCALC 125 07/06/2022    LDLCALC 116 08/27/2021     Lab Results   Component Value Date    VLDL NOT REPORTED 10/26/2016     Lab Results   Component Value Date    CHOLHDLRATIO 2.8 10/26/2016    Age/Gender Health

## 2023-10-04 ENCOUNTER — HOSPITAL ENCOUNTER (OUTPATIENT)
Dept: ULTRASOUND IMAGING | Age: 63
Discharge: HOME | End: 2023-10-04
Payer: COMMERCIAL

## 2023-10-04 DIAGNOSIS — E04.2 MULTIPLE THYROID NODULES: ICD-10-CM

## 2023-10-04 PROCEDURE — 88173 CYTOPATH EVAL FNA REPORT: CPT

## 2023-10-04 PROCEDURE — 76942 ECHO GUIDE FOR BIOPSY: CPT

## 2023-10-04 PROCEDURE — 88177 CYTP FNA EVAL EA ADDL: CPT

## 2023-10-04 PROCEDURE — 88172 CYTP DX EVAL FNA 1ST EA SITE: CPT

## 2023-10-04 NOTE — H&P
Formulation and discussion of sedation / procedure plans, risks, benefits, side effects and alternatives with patient and/or responsible adult completed. History and Physical reviewed and unchanged.     Electronically signed by Asif Taylor MD on 10/4/23 at 1:50 PM EDT

## 2023-10-04 NOTE — OP NOTE
Department of Radiology  Post Procedure Progress Note      Pre-Procedure Diagnosis:  thyroid nodules     Procedure Performed:  FNA biopsy left thyroid nodule     Anesthesia: local     Findings: successful    Immediate Complications:  None    Estimated Blood Loss: minimal    SEE DICTATED PROCEDURE NOTE FOR COMPLETE DETAILS.     Raghavendra Morgan MD   10/4/2023 1:50 PM

## 2023-10-12 ENCOUNTER — OFFICE VISIT (OUTPATIENT)
Dept: ENT CLINIC | Age: 63
End: 2023-10-12
Payer: COMMERCIAL

## 2023-10-12 VITALS
BODY MASS INDEX: 30.92 KG/M2 | SYSTOLIC BLOOD PRESSURE: 128 MMHG | WEIGHT: 181.1 LBS | HEIGHT: 64 IN | HEART RATE: 75 BPM | OXYGEN SATURATION: 99 % | TEMPERATURE: 97.5 F | DIASTOLIC BLOOD PRESSURE: 72 MMHG | RESPIRATION RATE: 16 BRPM

## 2023-10-12 DIAGNOSIS — E04.2 MULTIPLE THYROID NODULES: Primary | ICD-10-CM

## 2023-10-12 PROCEDURE — 99212 OFFICE O/P EST SF 10 MIN: CPT | Performed by: PHYSICIAN ASSISTANT

## 2023-10-12 NOTE — PROGRESS NOTES
[facility] [place] [facility]  [facility] EAR, NOSE AND THROAT  [address] [facility] [address]  Dept: [phone]  Dept Fax: [phone]  Loc: [phone]    Babak Silva is a 59 y.o. female who was referred by No ref. provider found for:  Chief Complaint   Patient presents with    Follow-up     Patient here for f/u after FNA. Patient states that she has been doing ok. Norma Nolen HPI:   Current visit documentation 10/12/2023: Babak Silva presents today for follow-up after FNA. She reports that she has been doing fairly well since she was last seen. She reports that the FNA was somewhat painful, she is recovered well. She denies any sensation of growth of the thyroid. She does report some occasional hoarseness, but not ongoing. She denies dysphagia, weight loss, fevers, chills. No other concerns reported at this time    Previous visit documentation 9/26/23: Babak Silva presents today for evaluation of thyroid nodules. The patient has a known history of thyroid nodules since at least 2018 and has been monitored by serial ultrasounds. She underwent FNA in 2018 that was benign, but on recent US was recommended to undergo FNA due to nodule being upgraded to TI-RADS 5. She denies any sensation of growth of her thyroid. She reports that she experiences intermittent throat clearing, which actually reminded her that she needed her thyroid recheck. She denies dysphagia, changes, invoice, weight loss, loss of appetite, fevers, chills, night sweats, sore throat, hemoptysis. She denies a family history of thyroid cancer. Subjective:      REVIEW OF SYSTEMS:    Pertinent positives as noted in the HPI. All other systems reviewed and negative.     ALLERGIES:  Ciprofloxacin and Keflex [cephalexin]    Past Medical History:  Past Medical History:   Diagnosis Date    Arthritis     Cerebral artery occlusion with cerebral infarction ([address])     GERD (gastroesophageal reflux

## 2023-10-31 ENCOUNTER — OFFICE VISIT (OUTPATIENT)
Dept: FAMILY MEDICINE CLINIC | Age: 63
End: 2023-10-31
Payer: COMMERCIAL

## 2023-10-31 VITALS
DIASTOLIC BLOOD PRESSURE: 80 MMHG | OXYGEN SATURATION: 98 % | RESPIRATION RATE: 14 BRPM | HEIGHT: 64 IN | TEMPERATURE: 97.8 F | SYSTOLIC BLOOD PRESSURE: 124 MMHG | HEART RATE: 79 BPM | BODY MASS INDEX: 30.8 KG/M2 | WEIGHT: 180.4 LBS

## 2023-10-31 DIAGNOSIS — F41.9 ANXIETY: ICD-10-CM

## 2023-10-31 DIAGNOSIS — Z12.4 PAPANICOLAOU SMEAR: Primary | ICD-10-CM

## 2023-10-31 DIAGNOSIS — Z11.51 SCREENING FOR HUMAN PAPILLOMAVIRUS (HPV): ICD-10-CM

## 2023-10-31 DIAGNOSIS — Z12.4 SCREENING FOR CERVICAL CANCER: ICD-10-CM

## 2023-10-31 PROCEDURE — 99396 PREV VISIT EST AGE 40-64: CPT | Performed by: NURSE PRACTITIONER

## 2023-10-31 PROCEDURE — 99213 OFFICE O/P EST LOW 20 MIN: CPT | Performed by: NURSE PRACTITIONER

## 2023-10-31 RX ORDER — BUSPIRONE HYDROCHLORIDE 10 MG/1
10 TABLET ORAL 2 TIMES DAILY
Qty: 180 TABLET | Refills: 4 | Status: SHIPPED | OUTPATIENT
Start: 2023-10-31 | End: 2024-10-30

## 2023-10-31 NOTE — PROGRESS NOTES
Melissa Grande is a 59 y.o. female for   Chief Complaint   Patient presents with    Gynecologic Exam     Med changes       HPI:    No LMP recorded. Patient is postmenopausal.    Patient Gynecological History No Yes Not Asked comment          History of Abnormal Pap [x]                     []                     []                  Last pap: 3 years ago   Has received HPV vaccine [x]                     []                     []                     Currently sexually active [x]                     []                     []                  post menopausal status   History of Sexually Transmitted Disease [x]            []                     []                     History of Abnormal Mammograms [x]            []                     []                   Last mammo: 11/2022     Last Dexa Scan: normal  Last Colonoscopy: 3 years ago    Urinary Incontinence? No  Dysuria? No  Vaginal itching or Dryness? No  Vaginal discharge? No    Maternal grandmother with breast cancer  Last mammogram 11.2022  Lat colonoscopy 3 years ago normal.   Taking daily MVI and D3 daily     Anxiety  -not at goal  Increase wellbutrin to 300 mg at last visit  Started buspar 7.5 mg bid states does not feel any different  Still has a lot of anxiety  Taking care of her mother  No panic attacks.    Denies depression  Declines paxil due to possible weight gain    Trying to loose weight   Wt Readings from Last 3 Encounters:   10/31/23 81.8 kg (180 lb 6.4 oz)   10/12/23 82.1 kg (181 lb 1.6 oz)   10/03/23 84.2 kg (185 lb 9.6 oz)      Social History     Socioeconomic History    Marital status:      Spouse name: Bina Heard    Number of children: 2    Years of education: Not on file    Highest education level: Not on file   Occupational History    Not on file   Tobacco Use    Smoking status: Former     Packs/day: 0.50     Years: 26.00     Additional pack years: 0.00     Total pack years: 13.00     Types: Cigarettes     Quit date: 3/7/2009     Years

## 2023-11-09 ENCOUNTER — HOSPITAL ENCOUNTER (OUTPATIENT)
Dept: WOMENS IMAGING | Age: 63
Discharge: HOME | End: 2023-11-09
Payer: COMMERCIAL

## 2023-11-09 DIAGNOSIS — Z12.31 VISIT FOR SCREENING MAMMOGRAM: ICD-10-CM

## 2023-11-09 PROCEDURE — 77063 BREAST TOMOSYNTHESIS BI: CPT

## 2023-11-09 RX ORDER — OMEPRAZOLE 20 MG/1
20 CAPSULE, DELAYED RELEASE ORAL 2 TIMES DAILY
Qty: 90 CAPSULE | Refills: 4 | Status: SHIPPED | OUTPATIENT
Start: 2023-11-09

## 2023-11-09 NOTE — TELEPHONE ENCOUNTER
Future Appointments   Date Time Provider [address]   4/12/2024  1:30 PM STR ULTRASOUND RM 2 [facility] US STR Rad/Card   4/30/2024 11:00 AM MARY ALICE Ramirez - CNP [facility] [address] [facility]

## 2023-11-12 LAB — CYTOLOGY THIN PREP PAP: NORMAL

## 2023-11-14 ENCOUNTER — TELEPHONE (OUTPATIENT)
Dept: FAMILY MEDICINE CLINIC | Age: 63
End: 2023-11-14

## 2023-11-14 NOTE — TELEPHONE ENCOUNTER
----- Message from MARY ALICE Green CNP sent at 11/13/2023  6:03 PM EST -----  Let pt know pap is normal, no cancerous cells, recommend repeat in 3-5 years

## 2023-11-16 ENCOUNTER — HOSPITAL ENCOUNTER (OUTPATIENT)
Dept: WOMENS IMAGING | Age: 63
Discharge: HOME | End: 2023-11-16
Attending: RADIOLOGY
Payer: COMMERCIAL

## 2023-11-16 DIAGNOSIS — R92.8 ABNORMAL MAMMOGRAM: ICD-10-CM

## 2023-11-16 PROCEDURE — 76642 ULTRASOUND BREAST LIMITED: CPT

## 2023-11-16 PROCEDURE — G0279 TOMOSYNTHESIS, MAMMO: HCPCS

## 2023-11-21 ENCOUNTER — HOSPITAL ENCOUNTER (OUTPATIENT)
Dept: WOMENS IMAGING | Age: 63
Discharge: HOME | End: 2023-11-21
Attending: RADIOLOGY
Payer: COMMERCIAL

## 2023-11-21 DIAGNOSIS — N63.12 MASS OF UPPER INNER QUADRANT OF RIGHT BREAST: ICD-10-CM

## 2023-11-21 PROCEDURE — 77065 DX MAMMO INCL CAD UNI: CPT

## 2023-11-21 PROCEDURE — 19083 BX BREAST 1ST LESION US IMAG: CPT

## 2023-11-21 PROCEDURE — 88305 TISSUE EXAM BY PATHOLOGIST: CPT

## 2023-11-21 PROCEDURE — 88360 TUMOR IMMUNOHISTOCHEM/MANUAL: CPT

## 2023-11-21 PROCEDURE — 88342 IMHCHEM/IMCYTCHM 1ST ANTB: CPT

## 2023-11-21 PROCEDURE — 88341 IMHCHEM/IMCYTCHM EA ADD ANTB: CPT

## 2023-11-21 NOTE — PROGRESS NOTES
Breast Biopsy Flowsheet/Post-Operative Care    Date of Procedure: 11/21/2023  Physician: Dr. Sugey Pierce  Technologist: Tianna SMITH(R)(M)    Biopsy:ultrasound guided breast biopsy  Lesion type: Non-palpable  Breast: right    Clock face position: Site #1: UIQ          Primary Method of Detection: Mammogram      Microcalcification's: no   Distribution: N/A    Asymmetry: symmetric    Biopsy Method:   Sertera:    Site # 1    Gauge: 14    # of Passes: 4     Clip: Carlitos        Pre-Op Assessment: (BI-RADS)   4. Suspicious Abnormality    Patient Tolerated Procedure: good  Complications: none  Comments: none    Post Operative Care  Steri strips: Yes  Dressing: Gauze, Tape   Ice Applied to Site:  Yes  Evidence of Bleeding:  No    Pain Verbalized: No      Written Discharge Instructions: Yes  Condition at Discharge: good  Time of Discharge: 1055    Electronically signed by Clover oRdriguez on 11/21/2023 at 10:56 AM

## 2023-11-21 NOTE — PROGRESS NOTES
[facility] [address] [place] [address]  Pre-Biopsy Assessment      Patient Education    Written information about procedure Yes  right   Procedural steps explained Yes Ultrasound Biopsy   Post-op potential: bruising, hematoma, pain Yes    Self-care: activity, care of dressing Yes    Patient verbalized understanding Yes    Consent signed and witnessed Yes      Hormone Therapy Status: none    Recent Medication: N/A Last Dose: n/a                                     Hormone Replacement Therapy: no    Previous Breast Biopsy: no    Previous Diagnosis Cancer: no    Hysterectomy:no    Emotional Status: Calm    Language or Physical Barriers: none    Comments: none      Electronically signed by Ramonita Gusman on 11/21/2023 at 10:26 AM

## 2023-11-22 ENCOUNTER — CLINICAL DOCUMENTATION (OUTPATIENT)
Dept: WOMENS IMAGING | Age: 63
End: 2023-11-22

## 2023-11-26 PROBLEM — D05.11 DUCTAL CARCINOMA IN SITU (DCIS) OF RIGHT BREAST: Status: ACTIVE | Noted: 2023-11-26

## 2023-11-27 ENCOUNTER — OFFICE VISIT (OUTPATIENT)
Dept: SURGERY | Age: 63
End: 2023-11-27
Payer: COMMERCIAL

## 2023-11-27 ENCOUNTER — TELEPHONE (OUTPATIENT)
Dept: CASE MANAGEMENT | Age: 63
End: 2023-11-27

## 2023-11-27 ENCOUNTER — TELEPHONE (OUTPATIENT)
Dept: SURGERY | Age: 63
End: 2023-11-27

## 2023-11-27 VITALS
OXYGEN SATURATION: 92 % | WEIGHT: 176 LBS | SYSTOLIC BLOOD PRESSURE: 132 MMHG | HEIGHT: 64 IN | HEART RATE: 88 BPM | RESPIRATION RATE: 18 BRPM | BODY MASS INDEX: 30.05 KG/M2 | DIASTOLIC BLOOD PRESSURE: 84 MMHG | TEMPERATURE: 97.8 F

## 2023-11-27 DIAGNOSIS — D05.11 DUCTAL CARCINOMA IN SITU (DCIS) OF RIGHT BREAST: Primary | ICD-10-CM

## 2023-11-27 DIAGNOSIS — Z01.818 PRE-OP TESTING: ICD-10-CM

## 2023-11-27 PROCEDURE — 99205 OFFICE O/P NEW HI 60 MIN: CPT | Performed by: SURGERY

## 2023-11-27 ASSESSMENT — ENCOUNTER SYMPTOMS
SINUS PAIN: 0
EYE ITCHING: 0
EYE PAIN: 0
EYE REDNESS: 0
APNEA: 0
CONSTIPATION: 1
RHINORRHEA: 0
TROUBLE SWALLOWING: 0
VOMITING: 0
WHEEZING: 0
EYE DISCHARGE: 0
RECTAL PAIN: 0
DIARRHEA: 0
ABDOMINAL DISTENTION: 0
CHOKING: 0
FACIAL SWELLING: 0
VOICE CHANGE: 0
ABDOMINAL PAIN: 0
COUGH: 0
STRIDOR: 0
COLOR CHANGE: 0
CHEST TIGHTNESS: 0
SINUS PRESSURE: 0
BACK PAIN: 0
ANAL BLEEDING: 0
NAUSEA: 0
BLOOD IN STOOL: 0
SORE THROAT: 0
PHOTOPHOBIA: 0
SHORTNESS OF BREATH: 0

## 2023-11-27 NOTE — TELEPHONE ENCOUNTER
Name: Netty Closs  : 1960  MRN: [id number]    Oncology Navigation Follow-Up Note    Contact Type:  Telephone    Notes: Left message for patient to call to set up teaching.     Electronically signed by Kumar Mahmood RN on 2023 at 9:47 AM

## 2023-11-27 NOTE — TELEPHONE ENCOUNTER
Patient scheduled for surgery with Dr. Nichelle Enriquez on 12- at 9:30am with an arrival of 7:30am at [facility]. Preop orders (EKG) and surgery instructions given. Patient to have lab work done that was ordered by Alli Early CNP. Surgery consent signed. Antibacterial soap given.

## 2023-11-27 NOTE — PROGRESS NOTES
Subjective:      Patient ID: Shad De La Paz is a 59 y.o. female. HPI  Chief Complaint   Patient presents with    Surgical Consult     NP refer C-Right breast ductal carcinoma       Review of Systems   Constitutional:  Positive for fatigue. Negative for activity change, appetite change, chills, diaphoresis, fever and unexpected weight change. HENT:  Negative for congestion, dental problem, drooling, ear discharge, ear pain, facial swelling, hearing loss, mouth sores, nosebleeds, postnasal drip, rhinorrhea, sinus pressure, sinus pain, sneezing, sore throat, tinnitus, trouble swallowing and voice change. Eyes:  Negative for photophobia, pain, discharge, redness, itching and visual disturbance. Respiratory:  Negative for apnea, cough, choking, chest tightness, shortness of breath, wheezing and stridor. Cardiovascular:  Negative for chest pain, palpitations and leg swelling. Gastrointestinal:  Positive for constipation. Negative for abdominal distention, abdominal pain, anal bleeding, blood in stool, diarrhea, nausea, rectal pain and vomiting. Endocrine: Negative for cold intolerance, heat intolerance, polydipsia, polyphagia and polyuria. Genitourinary:  Negative for decreased urine volume, difficulty urinating, dyspareunia, dysuria, enuresis, flank pain, frequency, genital sores, hematuria, menstrual problem, pelvic pain, urgency, vaginal bleeding, vaginal discharge and vaginal pain. Musculoskeletal:  Negative for arthralgias, back pain, gait problem, joint swelling, myalgias, neck pain and neck stiffness. Skin:  Negative for color change, pallor, rash and wound. Allergic/Immunologic: Negative for environmental allergies, food allergies and immunocompromised state. Neurological:  Negative for dizziness, tremors, seizures, syncope, facial asymmetry, speech difficulty, weakness, light-headedness, numbness and headaches. Hematological:  Negative for adenopathy.  Does not bruise/bleed
Hib vaccine  Aged Out    Meningococcal (ACWY) vaccine  Aged Out    Pneumococcal 0-64 years Vaccine  Aged Out    Diabetes screen  Discontinued       Review of Systems  Constitutional:  Positive for fatigue. Negative for activity change, appetite change, chills, diaphoresis, fever and unexpected weight change. HENT:  Negative for congestion, dental problem, drooling, ear discharge, ear pain, facial swelling, hearing loss, mouth sores, nosebleeds, postnasal drip, rhinorrhea, sinus pressure, sinus pain, sneezing, sore throat, tinnitus, trouble swallowing and voice change. Eyes:  Negative for photophobia, pain, discharge, redness, itching and visual disturbance. Respiratory:  Negative for apnea, cough, choking, chest tightness, shortness of breath, wheezing and stridor. Cardiovascular:  Negative for chest pain, palpitations and leg swelling. Gastrointestinal:  Positive for constipation. Negative for abdominal distention, abdominal pain, anal bleeding, blood in stool, diarrhea, nausea, rectal pain and vomiting. Endocrine: Negative for cold intolerance, heat intolerance, polydipsia, polyphagia and polyuria. Genitourinary:  Negative for decreased urine volume, difficulty urinating, dyspareunia, dysuria, enuresis, flank pain, frequency, genital sores, hematuria, menstrual problem, pelvic pain, urgency, vaginal bleeding, vaginal discharge and vaginal pain. Musculoskeletal:  Negative for arthralgias, back pain, gait problem, joint swelling, myalgias, neck pain and neck stiffness. Skin:  Negative for color change, pallor, rash and wound. Allergic/Immunologic: Negative for environmental allergies, food allergies and immunocompromised state. Neurological:  Negative for dizziness, tremors, seizures, syncope, facial asymmetry, speech difficulty, weakness, light-headedness, numbness and headaches. Hematological:  Negative for adenopathy. Does not bruise/bleed easily.    Psychiatric/Behavioral:  Negative for

## 2023-11-27 NOTE — TELEPHONE ENCOUNTER
[address] [facility], [address] [facility]    Phone * [phone]   Surgical Scheduling Direct line Phone * [phone]  Fax * [phone]      Robert Ga      1960    female    [address]   Marital Status:         Home Phone: [phone]   Cell Phone:   Telephone Information:   Mobile [phone]              Surgeon: Dr. Bj Vyas  Surgery Date:12-   Time: HUMA [place]     Procedure: Right breast lumpectomy with preoperative needle localization   Outpatient     Diagnosis: Ductal carcinoma in situ right breast    Important Medical History: In Epic    Special Inst/Equip:     CPT Codes: 25490    Latex Allergy:   no Cardiac Device:  no    Anesthesia Type: MAC    Case Location:  Main OR     Preadmission Testing: Phone Call      PAT Date and Time: ________________________________    PAT Confirmation #: _________________________________    Post Op Visit:  ______________________________________    Need Preop Cardiac Clearance:   no    Does patient have Cardiologist/physician?  No      Surgery Conformation #:  ______________________________________________    : __________________________________ Date:____________________        [employer] Name:  Kushal De Leon

## 2023-11-27 NOTE — TELEPHONE ENCOUNTER
Name: Babak Silva  : 1960  MRN: [id number]    Oncology Navigation- Initial Note:    Intake-  Contact Type: Telephone    Diagnosis: Breast-malignant, right DCIS, grade 1, ER+ MT+    Home Disposition: Lives with other who is able to assist,  Kat Shirley    Patient needs and barriers to care: Coordination of Care, Knowledge deficit, and Emotional Issues/ Fear/ Anxiety     Referral Source: Outside Provider    Receptive to Advanced Care Planning/ Palliative Care:  N/A, clinical stage 0    Interventions-   General Interventions: Seeing Dr. Okey Cranker today. Offered teaching today. Instructed her to let  know if wants teaching. We can completed after she's done in their office. Does not meet criteria for genetic testing. Family history updated. Asked about recovery time from lumpectomy. Denies any other questions at this time. Education/Screenings:  yes - Breast Cancer packet, navigation packet, Discussed treatment sequence: surgery, no sentinel node, radiation, and antiestrogen. No chemo for DCIS. Deferred MRI, needle localization, or mag seed to surgeon. Currently on HRT: no     Referrals: Spiritual Care and  per protocol. **Wants Dr. Una Cm and Dr. Aleena Akers per patient's request.      Continuum of Care: Diagnosis/Active Treatment    Notes: Teaching completed and verbalizes understanding. Questions addressed. Emotional support offered. Encouraged her to call anytime if questions or concerns. Lionel follow through continuum of care.     Electronically signed by Fili Diaz RN on 2023 at 10:07 AM

## 2023-11-29 ENCOUNTER — TELEPHONE (OUTPATIENT)
Dept: SURGERY | Age: 63
End: 2023-11-29

## 2023-11-29 NOTE — TELEPHONE ENCOUNTER
Left voicemail letting patient know her FMLA paperwork is completed and she needs to sign release and pay

## 2023-11-30 ENCOUNTER — NURSE ONLY (OUTPATIENT)
Dept: LAB | Age: 63
End: 2023-11-30

## 2023-11-30 ENCOUNTER — TELEPHONE (OUTPATIENT)
Dept: FAMILY MEDICINE CLINIC | Age: 63
End: 2023-11-30

## 2023-11-30 DIAGNOSIS — Z00.00 ENCOUNTER FOR MEDICAL EXAMINATION TO ESTABLISH CARE: ICD-10-CM

## 2023-11-30 DIAGNOSIS — F41.9 ANXIETY: ICD-10-CM

## 2023-11-30 DIAGNOSIS — E78.5 DYSLIPIDEMIA: ICD-10-CM

## 2023-11-30 DIAGNOSIS — Z86.73 HISTORY OF STROKE: ICD-10-CM

## 2023-11-30 DIAGNOSIS — F39 MOOD DISORDER (HCC): ICD-10-CM

## 2023-11-30 LAB
ANION GAP SERPL CALC-SCNC: 11 MEQ/L (ref 8–16)
BASOPHILS ABSOLUTE: 0 THOU/MM3 (ref 0–0.1)
BASOPHILS NFR BLD AUTO: 0.7 %
BUN SERPL-MCNC: 13 MG/DL (ref 7–22)
CALCIUM SERPL-MCNC: 9.3 MG/DL (ref 8.5–10.5)
CHLORIDE SERPL-SCNC: 101 MEQ/L (ref 98–111)
CHOLEST SERPL-MCNC: 124 MG/DL (ref 100–199)
CO2 SERPL-SCNC: 27 MEQ/L (ref 23–33)
CREAT SERPL-MCNC: 0.9 MG/DL (ref 0.4–1.2)
DEPRECATED RDW RBC AUTO: 44.1 FL (ref 35–45)
EOSINOPHIL NFR BLD AUTO: 5.2 %
EOSINOPHILS ABSOLUTE: 0.4 THOU/MM3 (ref 0–0.4)
ERYTHROCYTE [DISTWIDTH] IN BLOOD BY AUTOMATED COUNT: 13.3 % (ref 11.5–14.5)
GFR SERPL CREATININE-BSD FRML MDRD: > 60 ML/MIN/1.73M2
GLUCOSE SERPL-MCNC: 84 MG/DL (ref 70–108)
HCT VFR BLD AUTO: 42.9 % (ref 37–47)
HDLC SERPL-MCNC: 69 MG/DL
HGB BLD-MCNC: 14 GM/DL (ref 12–16)
IMM GRANULOCYTES # BLD AUTO: 0.02 THOU/MM3 (ref 0–0.07)
IMM GRANULOCYTES NFR BLD AUTO: 0.3 %
LDLC SERPL CALC-MCNC: 41 MG/DL
LYMPHOCYTES ABSOLUTE: 1.6 THOU/MM3 (ref 1–4.8)
LYMPHOCYTES NFR BLD AUTO: 22 %
MCH RBC QN AUTO: 29.4 PG (ref 26–33)
MCHC RBC AUTO-ENTMCNC: 32.6 GM/DL (ref 32.2–35.5)
MCV RBC AUTO: 90.1 FL (ref 81–99)
MONOCYTES ABSOLUTE: 0.4 THOU/MM3 (ref 0.4–1.3)
MONOCYTES NFR BLD AUTO: 5.9 %
NEUTROPHILS NFR BLD AUTO: 65.9 %
NRBC BLD AUTO-RTO: 0 /100 WBC
PLATELET # BLD AUTO: 270 THOU/MM3 (ref 130–400)
PMV BLD AUTO: 9.7 FL (ref 9.4–12.4)
POTASSIUM SERPL-SCNC: 4.4 MEQ/L (ref 3.5–5.2)
RBC # BLD AUTO: 4.76 MILL/MM3 (ref 4.2–5.4)
SEGMENTED NEUTROPHILS ABSOLUTE COUNT: 4.7 THOU/MM3 (ref 1.8–7.7)
SODIUM SERPL-SCNC: 139 MEQ/L (ref 135–145)
TRIGL SERPL-MCNC: 70 MG/DL (ref 0–199)
WBC # BLD AUTO: 7.1 THOU/MM3 (ref 4.8–10.8)

## 2023-11-30 NOTE — TELEPHONE ENCOUNTER
----- Message from MARY ALICE Hatch CNP sent at 11/30/2023 11:52 AM EST -----  Let pt know her labs look good, her renal, and TSH,  function  are normal, her cholesterol has decreased from 220 to 124, this is great , continue with the lipitor daily let me know  if any questions.

## 2023-12-01 ENCOUNTER — CLINICAL DOCUMENTATION (OUTPATIENT)
Dept: CASE MANAGEMENT | Age: 63
End: 2023-12-01

## 2023-12-01 NOTE — PROGRESS NOTES
Name: Ariana Minor  : 1960  MRN: [id number]    Oncology Navigation Note: Breast Conference Recommendations     Patient attended clinic: No     Team Members: Gera Small, Abhi, Alexis Auguste Lebavere, Darcy, Elsie     Recommendations: Right breast lumpectomy scheduled for 23, RT might not be needed, AI    Electronically signed by Mitesh Ridley RN on 2023 at 2:11 PM

## 2023-12-04 ENCOUNTER — HOSPITAL ENCOUNTER (OUTPATIENT)
Age: 63
Discharge: HOME | End: 2023-12-04
Payer: COMMERCIAL

## 2023-12-04 ENCOUNTER — SOCIAL WORK (OUTPATIENT)
Dept: INFUSION THERAPY | Age: 63
End: 2023-12-04

## 2023-12-04 DIAGNOSIS — D05.11 DUCTAL CARCINOMA IN SITU (DCIS) OF RIGHT BREAST: ICD-10-CM

## 2023-12-04 DIAGNOSIS — Z01.818 PRE-OP TESTING: ICD-10-CM

## 2023-12-04 PROCEDURE — 93010 ELECTROCARDIOGRAM REPORT: CPT | Performed by: INTERNAL MEDICINE

## 2023-12-04 PROCEDURE — 93005 ELECTROCARDIOGRAM TRACING: CPT

## 2023-12-04 NOTE — PROGRESS NOTES
Oncology Social Work    Date: 12/4/2023  Time: 12:09 PM  Name: Sandra Serna  MRN: [id number]     Contact Type: Follow-up    Note:   Situation: This staff called Sandra Serna via phone support to introduce myself as their Oncology Social Worker. Background: Priscilla Jo had her recent appointment with the General Surgery Dept. This staff was calling to review if she had any outstanding concerns. Assessment: The contact number provided to this staff and Medical Records is her identified number as confirmed by her voicemail ID. Since the number went immediately to a voicemail; a message was left explaining the purpose of my call and to introduce myself as part of her care team.   - Education regarding the services was provided by the SW.  - No community referrals were placed at this time because this staff has no indication of where Priscilla Jo is in her treatment plan. Referral services may be reconsidered should she express needs regarding assistance. Recommendation: Follow-up will be initiated by Priscilla Jo based on need should she choose to return my call.  provided her with my contact information and will remain available for support.       MOHAN Reyes, KIMBER, MANDY  Oncology Social Worker      Electronically signed by MOHAN Reyes LSW, MANDY on 12/4/2023 at 12:09 PM

## 2023-12-05 RX ORDER — CLINDAMYCIN PHOSPHATE 300 MG/50ML
300 INJECTION INTRAVENOUS
Status: CANCELLED | OUTPATIENT
Start: 2023-12-05 | End: 2023-12-05 | Stop reason: RX

## 2023-12-05 NOTE — H&P
[facility]  Progress Note    Kaila Platt Patient Status:  Inpatient    1966 MRN [id number]   [facility] 6NE-A Attending Wong Raymond MD   [facility] Day # 6 PCP Kevin Paredes MD     CC: Medical management    SUBJECTIVE:  Eliseo H and P for [address]  History and Physical Update    Pt Name: Aquiles Dunn  MRN: [id number]  YOB: 1960  Date of evaluation: 12/5/2023    [x] I have examined the patient and reviewed the H&P/Consult and there are no changes to the patient or plans.     [] I have examined the patient and reviewed the H&P/Consult and have noted the following changes:        Ashu Giles MD  Electronically signed 12/5/2023 at 10:33 AM mg, Oral, Q8H [address]  lisinopril tab 10 mg, 10 mg, Oral, Daily  Pantoprazole Sodium (PROTONIX) EC tab 40 mg, 40 mg, Oral, QAM AC  HYDROcodone-acetaminophen (NORCO) 5-325 MG per tab 1 tablet, 1 tablet, Oral, PRN   Or  HYDROcodone-acetaminophen (NORCO) 5-325 MG expected to be discharge to: AR      Questions/concerns and Plan of care were discussed with patient. Discussed with RN.     Td Adams MD  [facility]

## 2023-12-06 LAB
EKG ATRIAL RATE: 83 BPM
EKG P AXIS: 75 DEGREES
EKG P-R INTERVAL: 148 MS
EKG Q-T INTERVAL: 376 MS
EKG QRS DURATION: 90 MS
EKG QTC CALCULATION (BAZETT): 441 MS
EKG R AXIS: 30 DEGREES
EKG T AXIS: 79 DEGREES
EKG VENTRICULAR RATE: 83 BPM

## 2023-12-07 ENCOUNTER — ANESTHESIA (OUTPATIENT)
Dept: OPERATING ROOM | Age: 63
End: 2023-12-07
Payer: COMMERCIAL

## 2023-12-07 ENCOUNTER — HOSPITAL ENCOUNTER (OUTPATIENT)
Dept: WOMENS IMAGING | Age: 63
Discharge: HOME | End: 2023-12-07
Attending: SURGERY
Payer: COMMERCIAL

## 2023-12-07 ENCOUNTER — HOSPITAL ENCOUNTER (OUTPATIENT)
Age: 63
Setting detail: OUTPATIENT SURGERY
Discharge: HOME | End: 2023-12-07
Attending: SURGERY | Admitting: SURGERY
Payer: COMMERCIAL

## 2023-12-07 ENCOUNTER — ANESTHESIA EVENT (OUTPATIENT)
Dept: OPERATING ROOM | Age: 63
End: 2023-12-07
Payer: COMMERCIAL

## 2023-12-07 VITALS
RESPIRATION RATE: 20 BRPM | DIASTOLIC BLOOD PRESSURE: 65 MMHG | TEMPERATURE: 96.9 F | HEART RATE: 67 BPM | BODY MASS INDEX: 28.89 KG/M2 | SYSTOLIC BLOOD PRESSURE: 110 MMHG | WEIGHT: 169.2 LBS | OXYGEN SATURATION: 97 % | HEIGHT: 64 IN

## 2023-12-07 DIAGNOSIS — D05.11 DUCTAL CARCINOMA IN SITU (DCIS) OF RIGHT BREAST: ICD-10-CM

## 2023-12-07 DIAGNOSIS — Z98.890 S/P LUMPECTOMY, RIGHT BREAST: Primary | ICD-10-CM

## 2023-12-07 PROCEDURE — 2500000003 HC RX 250 WO HCPCS: Performed by: SURGERY

## 2023-12-07 PROCEDURE — 3600000012 HC SURGERY LEVEL 2 ADDTL 15MIN: Performed by: SURGERY

## 2023-12-07 PROCEDURE — 6370000000 HC RX 637 (ALT 250 FOR IP): Performed by: SURGERY

## 2023-12-07 PROCEDURE — 7100000010 HC PHASE II RECOVERY - FIRST 15 MIN: Performed by: SURGERY

## 2023-12-07 PROCEDURE — 2580000003 HC RX 258: Performed by: SURGERY

## 2023-12-07 PROCEDURE — 88307 TISSUE EXAM BY PATHOLOGIST: CPT

## 2023-12-07 PROCEDURE — 2709999900 US PLACE BREAST LOC DEVICE 1ST LESION RIGHT

## 2023-12-07 PROCEDURE — 6360000002 HC RX W HCPCS: Performed by: SURGERY

## 2023-12-07 PROCEDURE — G0279 TOMOSYNTHESIS, MAMMO: HCPCS

## 2023-12-07 PROCEDURE — 3700000001 HC ADD 15 MINUTES (ANESTHESIA): Performed by: SURGERY

## 2023-12-07 PROCEDURE — 3600000002 HC SURGERY LEVEL 2 BASE: Performed by: SURGERY

## 2023-12-07 PROCEDURE — 7100000011 HC PHASE II RECOVERY - ADDTL 15 MIN: Performed by: SURGERY

## 2023-12-07 PROCEDURE — 76098 X-RAY EXAM SURGICAL SPECIMEN: CPT

## 2023-12-07 PROCEDURE — 19301 PARTIAL MASTECTOMY: CPT | Performed by: SURGERY

## 2023-12-07 PROCEDURE — 2709999900 HC NON-CHARGEABLE SUPPLY: Performed by: SURGERY

## 2023-12-07 PROCEDURE — 3700000000 HC ANESTHESIA ATTENDED CARE: Performed by: SURGERY

## 2023-12-07 PROCEDURE — 6360000002 HC RX W HCPCS: Performed by: NURSE ANESTHETIST, CERTIFIED REGISTERED

## 2023-12-07 RX ORDER — IBUPROFEN 800 MG/1
800 TABLET ORAL ONCE
Status: COMPLETED | OUTPATIENT
Start: 2023-12-07 | End: 2023-12-07

## 2023-12-07 RX ORDER — PROPOFOL 10 MG/ML
INJECTION, EMULSION INTRAVENOUS CONTINUOUS PRN
Status: DISCONTINUED | OUTPATIENT
Start: 2023-12-07 | End: 2023-12-07 | Stop reason: SDUPTHER

## 2023-12-07 RX ORDER — FENTANYL CITRATE 50 UG/ML
INJECTION, SOLUTION INTRAMUSCULAR; INTRAVENOUS PRN
Status: DISCONTINUED | OUTPATIENT
Start: 2023-12-07 | End: 2023-12-07 | Stop reason: SDUPTHER

## 2023-12-07 RX ORDER — SODIUM CHLORIDE 0.9 % (FLUSH) 0.9 %
5-40 SYRINGE (ML) INJECTION PRN
Status: DISCONTINUED | OUTPATIENT
Start: 2023-12-07 | End: 2023-12-07 | Stop reason: HOSPADM

## 2023-12-07 RX ORDER — HYDROCODONE BITARTRATE AND ACETAMINOPHEN 5; 325 MG/1; MG/1
1 TABLET ORAL EVERY 6 HOURS PRN
Qty: 12 TABLET | Refills: 0 | Status: SHIPPED | OUTPATIENT
Start: 2023-12-07 | End: 2023-12-10

## 2023-12-07 RX ORDER — SODIUM CHLORIDE 9 MG/ML
INJECTION, SOLUTION INTRAVENOUS PRN
Status: DISCONTINUED | OUTPATIENT
Start: 2023-12-07 | End: 2023-12-07 | Stop reason: HOSPADM

## 2023-12-07 RX ORDER — SODIUM CHLORIDE 0.9 % (FLUSH) 0.9 %
5-40 SYRINGE (ML) INJECTION EVERY 12 HOURS SCHEDULED
Status: DISCONTINUED | OUTPATIENT
Start: 2023-12-07 | End: 2023-12-07 | Stop reason: HOSPADM

## 2023-12-07 RX ORDER — ACETAMINOPHEN 500 MG
1000 TABLET ORAL ONCE
Status: COMPLETED | OUTPATIENT
Start: 2023-12-07 | End: 2023-12-07

## 2023-12-07 RX ORDER — SODIUM CHLORIDE 9 MG/ML
INJECTION, SOLUTION INTRAVENOUS CONTINUOUS
Status: DISCONTINUED | OUTPATIENT
Start: 2023-12-07 | End: 2023-12-07 | Stop reason: HOSPADM

## 2023-12-07 RX ORDER — HYDROCODONE BITARTRATE AND ACETAMINOPHEN 5; 325 MG/1; MG/1
1 TABLET ORAL ONCE
Status: COMPLETED | OUTPATIENT
Start: 2023-12-07 | End: 2023-12-07

## 2023-12-07 RX ADMIN — ACETAMINOPHEN 1000 MG: 500 TABLET ORAL at 09:24

## 2023-12-07 RX ADMIN — SODIUM CHLORIDE: 9 INJECTION, SOLUTION INTRAVENOUS at 09:24

## 2023-12-07 RX ADMIN — PROPOFOL 200 MCG/KG/MIN: 10 INJECTION, EMULSION INTRAVENOUS at 09:37

## 2023-12-07 RX ADMIN — FENTANYL CITRATE 100 MCG: 50 INJECTION, SOLUTION INTRAMUSCULAR; INTRAVENOUS at 09:35

## 2023-12-07 RX ADMIN — Medication 2000 MG: at 09:39

## 2023-12-07 RX ADMIN — IBUPROFEN 800 MG: 800 TABLET, FILM COATED ORAL at 09:24

## 2023-12-07 RX ADMIN — HYDROCODONE BITARTRATE AND ACETAMINOPHEN 1 TABLET: 5; 325 TABLET ORAL at 10:58

## 2023-12-07 ASSESSMENT — PAIN SCALES - GENERAL: PAINLEVEL_OUTOF10: 7

## 2023-12-07 ASSESSMENT — PAIN DESCRIPTION - ORIENTATION: ORIENTATION: RIGHT

## 2023-12-07 ASSESSMENT — PAIN DESCRIPTION - LOCATION: LOCATION: BREAST

## 2023-12-07 ASSESSMENT — PAIN - FUNCTIONAL ASSESSMENT: PAIN_FUNCTIONAL_ASSESSMENT: 0-10

## 2023-12-07 ASSESSMENT — PAIN DESCRIPTION - DESCRIPTORS: DESCRIPTORS: ACHING

## 2023-12-07 NOTE — H&P
H and P for [address]  History and Physical Update    Pt Name: Rae Shirley  MRN: [id number]  YOB: 1960  Date of evaluation: 12/7/2023    [x] I have examined the patient and reviewed the H&P/Consult and there are no changes to the patient or plans.     [] I have examined the patient and reviewed the H&P/Consult and have noted the following changes:        Zayra Mathias MD  Electronically signed 12/7/2023 at 10:13 AM

## 2023-12-07 NOTE — PROGRESS NOTES
Pt has met discharge criteria and states she is ready for discharge to home. IV removed, gauze and tape applied. Dressed in own clothes and personal belongings gathered. Discharge instructions (with opioid medication education information) given to pt and family; pt and family verbalized understanding of discharge instructions, prescriptions and follow up appointments. Pt transported to discharge lobby by Erin Yoo Principal [facility] staff.

## 2023-12-07 NOTE — PROGRESS NOTES
[facility]. Vitals and assessment as charted. 0.9 infusing, @650ml to count from PACU. Pt has crackers and water. Family at the bedside. Pt and family verbalized understanding of discharge criteria and call light use. Call light in reach.

## 2023-12-07 NOTE — OP NOTE
[facility]  Operative Report    PATIENT NAME: Nya [address] RECORD NO. [id number]  SURGEON: Jillian Goldmann, MD MD FACS  Primary Care Physician: Deja Hickman APRN - CNP  Date: 12/7/2023, 10:13 AM     PROCEDURE PERFORMED: Right  Lumpectomy   PREOPERATIVE DIAGNOSIS: Right   Active Hospital Problems    Diagnosis Date Noted    Ductal carcinoma in situ (DCIS) of right breast [D05.11] 11/26/2023     POSTOPERATIVE DIAGNOSIS: Same, path pending  SURGEON:  Jillian Goldmann, MD MD FACS  ANESTHESIA:  Monitored Local Anesthesia with Sedation  ESTIMATED BLOOD LOSS:  5  ml  SPECIMEN:  oriented lumpectomy specimen and new skin margin  COMPLICATIONS:  None; patient tolerated the procedure well. DRAINS: none  DISPOSITION: {Outpatient Surgery  CONDITION: stable      Indications: This patient presents with history of    Active Hospital Problems    Diagnosis Date Noted    Ductal carcinoma in situ (DCIS) of right breast [D05.11] 11/26/2023         Procedure Details   The patient was seen in the Holding Area. The risks, benefits, complications, treatment options, and expected outcomes had been previously discussed with the patient. The possibilities of reaction to medication, pulmonary aspiration, bleeding, infection, the need for additional procedures, failure to diagnose a condition, and creating a complication requiring transfusion or operation were discussed with the patient. The patient concurred with the proposed plan, giving informed consent. The site of surgery properly noted/marked. The patient was brought to the operating room, placed supine on the operating room table, time, was taken, preoperative antibiotics were given, and signed consent on the chart. Sequential compression devices were in place. Previous needle localization had been performed . We turned our attention to the needle localization site. The site on the skin was identified and infiltrated with local anesthesia.  Once local anesthesia was adequately induced, the lumpectomy was performed by creating an oblique incision over the 12:00 o'clock of the breastaround the previously placed localization guidewire. . Following the wire down, the portion of the breast needing excision as identified by needle localization, was excised sharply with Metzenbaum scissors and electrocautery. Every effort was made to stay outside the identified area to obtain clear margins. Once the mass was excised, it was oriented for pathology with a margin map, placed on a grid and sent for x-ray confirmation. It was closest on the skin margin so additional skin margin excised and sent as well. The lumpectomy site was then reinspected for hemostasis. Hemostasis was accomplished with electrocautery. Clips were spaced for marking of the cavity for radiation planning. Once hemostasis was satisfactory, the wound was closed with 3-0 Vicryl for deep tissue, and 4-0 Vicryl running, subcuticular stitch for the skin. Dermaflex  applied, and the patient brought back to outpatient in stable condition. At the end of the procedure, sponge and needle counts correct. No apparent complications were noted.                   Specimens:  oriented lumpectomy specimen                   Stage:  Cancer Staging   Ductal carcinoma in situ (DCIS) of right breast  Staging form: Breast, AJCC 8th Edition  - Clinical stage from 11/26/2023: Stage 0 (cTis (DCIS), cN0, cM0, G1, ER+, LA+, HER2: Not Assessed) - Signed by Jani Franco MD on 11/26/2023           Jani Franco MD, MD FACS  Electronically signed 12/7/2023 at 10:13 AM

## 2023-12-07 NOTE — PROGRESS NOTES
Pt admitted to [facility] room 12 and oriented to unit. SCD sleeves applied. Nares swabbed. Pt verbalized permission for first name, last initial and physicians name on white board. SDS board and discharge criteria explained, pt and family verbalized understanding. Pt denies thoughts of harming self or others. Call light in reach. Family at the bedside.   Jose R Lagunas [phone]

## 2023-12-07 NOTE — PROGRESS NOTES
Report given to [facility] nurse on patient's procedure. Patient resting comfortably in bed, family nearby.

## 2023-12-07 NOTE — PROGRESS NOTES
Contact Type: [facility]    Contact Information: Arrived with her daughter for needle localization. Having breast surgery today with Dr. Eloise Petit. Diagnosis: DCIS with papillary lesion     Notes: Procedure explained and questions addressed as needed. Dr. Solomon Goodwin placed wire. Secured with gauze and tape per protocol. Gift given, Transported patient with belongings to Same Day Surgery via wheelchair at 0900.

## 2023-12-07 NOTE — DISCHARGE INSTRUCTIONS
[address] [facility] INSTRUCTIONS    Pt Name: Nya [address] Record Number: [id number]  Today's Date: 12/7/2023           GENERAL ANESTHESIA OR SEDATION:    [x]  Do not drive or operate hazardous machinery for 24 hours. [x] Do not make important business or personal decisions for 24 hours. [x] Do not drink alcoholic beverages or use tobacco for 24 hours. ACTIVITY INSTRUCTIONS:    [] Rest today. Resume light to normal activity tomorrow. [x] You may resume normal activity tomorrow. Do not engage in strenuous activity that may place stress on your incision. [x] Do not drive  UNTIL NO LONGER TAKING NARCOTICS AND DAILY ACTIVITIES  ARE NEARLY NORMAL     [x]Avoid heavy lifting, tugging, pullings greater  than  25 lbs until seen in the office. DIET INSTRUCTIONS:    []Begin with clear liquids. If not nauseated, may increase to a low-fat diet when you desire. Greasy and spicy foods are not advised. [x]Regular diet as tolerated. []Other:          MEDICATIONS  [x]Prescription sent with you to be used as directed. []Lortab   [x]Norco   []Percocet   []Tylenol #3   []NONE              []Antibiotic              []Tramadol       Do not drink alcohol or drive while taking these medications. You may experience dizziness or drowsiness with these medications. You may also experience constipation which can be relieved with stool softners or laxatives. [x]You may resume your daily prescription medication schedule unless otherwise specified. []Do not take 325mg Aspirin or other blood thinners such as Coumadin or Plavix for 5 days. WOUND/DRESSING INSTRUCTIONS:    Always ensure you and your care giver clean hands before and after caring for the wound. [] Keep dressing clean and dry for 24 hours. [] Allow steri-strips to fall off on their own.   [] Ice operative site for 20 minutes 4 times a day.      [x] May wash over incision in shower,

## 2023-12-07 NOTE — ANESTHESIA POSTPROCEDURE EVALUATION
Department of Anesthesiology  Postprocedure Note    Patient: Claudia Reyes  MRN: [id number]  YOB: 1960  Date of evaluation: 12/7/2023      Procedure Summary       Date: 12/07/23 Room / Location: [address] [place] [address] / [address] [place] [address]    Anesthesia Start: 1367 Anesthesia Stop: 9814    Procedure: RIGHT BREAST LUMPECTOMY WITH PREOPERATIVE NEEDLE LOCALIZATION (Right: Breast) Diagnosis:       Ductal carcinoma in situ (DCIS) of right breast      (Ductal carcinoma in situ (DCIS) of right breast [D05.11])    Surgeons: Jillian Goldmann, MD Responsible Provider: Vincent Choudhary MD    Anesthesia Type: MAC ASA Status: 2            Anesthesia Type: No value filed.     Stuart Phase I: Stuart Score: 10    Stuart Phase II: Stuart Score: 10      Anesthesia Post Evaluation    Patient location during evaluation: PACU  Patient participation: complete - patient participated  Level of consciousness: awake and alert  Airway patency: patent  Nausea & Vomiting: no nausea  Complications: no  Cardiovascular status: blood pressure returned to baseline and hemodynamically stable  Respiratory status: acceptable and spontaneous ventilation  Hydration status: euvolemic  Pain management: adequate

## 2023-12-11 ENCOUNTER — TELEPHONE (OUTPATIENT)
Dept: SPIRITUAL SERVICES | Facility: CLINIC | Age: 63
End: 2023-12-11

## 2023-12-11 DIAGNOSIS — D05.11 DUCTAL CARCINOMA IN SITU (DCIS) OF RIGHT BREAST: ICD-10-CM

## 2023-12-12 ENCOUNTER — TELEPHONE (OUTPATIENT)
Dept: FAMILY MEDICINE CLINIC | Age: 63
End: 2023-12-12

## 2023-12-12 NOTE — TELEPHONE ENCOUNTER
Catina Gamboa, APRN - CNP  Ward Peterson, APRN - CNP  Call pt and see if buspar 10 mg working for anxiety

## 2023-12-13 ENCOUNTER — TELEPHONE (OUTPATIENT)
Dept: SURGERY | Age: 63
End: 2023-12-13

## 2023-12-13 ENCOUNTER — OFFICE VISIT (OUTPATIENT)
Dept: SURGERY | Age: 63
End: 2023-12-13

## 2023-12-13 VITALS
TEMPERATURE: 97.2 F | HEART RATE: 74 BPM | SYSTOLIC BLOOD PRESSURE: 128 MMHG | DIASTOLIC BLOOD PRESSURE: 80 MMHG | WEIGHT: 169.3 LBS | BODY MASS INDEX: 28.9 KG/M2 | HEIGHT: 64 IN | RESPIRATION RATE: 18 BRPM | OXYGEN SATURATION: 90 %

## 2023-12-13 DIAGNOSIS — E11.9 TYPE 2 DIABETES MELLITUS WITHOUT COMPLICATION, WITHOUT LONG-TERM CURRENT USE OF INSULIN (HCC): ICD-10-CM

## 2023-12-13 DIAGNOSIS — D05.11 DUCTAL CARCINOMA IN SITU (DCIS) OF RIGHT BREAST: Primary | ICD-10-CM

## 2023-12-13 PROCEDURE — 99024 POSTOP FOLLOW-UP VISIT: CPT | Performed by: SURGERY

## 2023-12-13 NOTE — TELEPHONE ENCOUNTER
[address] [facility] [address], [address] [facility]    Phone * [phone]   Surgical Scheduling Direct line Phone * [phone]  Fax * [phone]      Claudia Spine      1960    female    [address] [address]   Marital Status:         Home Phone: [phone]   Cell Phone:   Telephone Information:   Mobile [phone]              Surgeon: Dr. Deanne Wood  Surgery Date:12-   Time: [place]     Procedure: Re-excision right breast margins  Outpatient     Diagnosis: Ductal carcinoma in situ right breast    Important Medical History: In Epic    Special Inst/Equip:     CPT Codes: 70613    Latex Allergy:   no Cardiac Device:  no    Anesthesia Type: MAC/ TIVA    Case Location:  Main OR     Preadmission Testing: Phone Call      PAT Date and Time: ________________________________    PAT Confirmation #: _________________________________    Post Op Visit:  ______________________________________    Need Preop Cardiac Clearance:   no    Does patient have Cardiologist/physician?  No      Surgery Conformation #:  ______________________________________________    : __________________________________ Date:____________________        [employer] Name:  Ksenia Braxton

## 2023-12-13 NOTE — H&P
H and P for [address] Okey Cranker MD [facility]  General Surgery  Postprocedure Evaluation in Office  Pt Name: Babak Silva  Date of Birth 1960   Today's Date: 12/13/2023  Medical Record Number: [id number]  Referring Provider: No ref. provider found  Primary Care Provider: MARY ALICE Drake - CNP  No chief complaint on file. ASSESSMENT      Problem List Items Addressed This Visit    None       Cancer Staging   Ductal carcinoma in situ (DCIS) of right breast  Staging form: Breast, AJCC 8th Edition  - Clinical stage from 11/26/2023: Stage 0 (cTis (DCIS), cN0, cM0, G1, ER+, [place]+, HER2: Not Assessed) - Signed by Lupis Bridges MD on 11/26/2023  - Pathologic stage from 12/13/2023: Stage 0 (pTis (DCIS), cN0, cM0, G1, ER+, [place]+, HER2: Not Assessed) - Signed by Lupis Bridges MD on 12/13/2023       PLANS       There are no outpatient Patient Instructions on file for this admission. Schedule for re excision skin margin  TIVA  OP [facility]  In the office we had a discussion regarding pathology going over the report. I did discuss with her that I felt that a better skin margin would be beneficial and that reexcision was indicated she understands and we are going to proceed as soon as we get on the schedule reexcision of skin margin  Follow up: No follow-ups on file. Final pathology review:   Multifocal DCIS Right  4  A copy of final pathology report was given to the patient with hardcopy yes  5  Patient referred back to prehab for postoperative functional evaluation N/A  Orders Placed This Encounter:  No orders of the defined types were placed in this encounter. SUBJECTIVE   Subjective   Alicia Salazar is seen today for  follow-up. She is 6 day(s) status post Right lumpectomy.   Her pathology was ductal carcinoma in-situ    Cancer Staging   Ductal carcinoma in situ (DCIS) of right breast  Staging form: Breast, AJCC 8th Edition  - Clinical stage from 11/26/2023: Stage 0 (cTis tablet 4    atorvastatin (LIPITOR) 20 MG tablet Take 1 tablet by mouth daily 90 tablet 4    metFORMIN (GLUCOPHAGE) 1000 MG tablet Take 1 tablet by mouth 2 times daily (with meals) 180 tablet 1    b complex vitamins capsule Take 1 capsule by mouth daily      CALCIUM-MAGNESIUM-VITAMIN D PO Take by mouth      Multiple Vitamin (MULTI-VITAMIN DAILY PO) Take by mouth       Allergies  Allergies   Allergen Reactions    Ciprofloxacin Rash    Keflex [Cephalexin] Rash     Many years ago      Social History  Social History     Socioeconomic History    Marital status:      Spouse name: Jeimy Beverage    Number of children: 2    Years of education: Not on file    Highest education level: Not on file   Occupational History    Not on file   Tobacco Use    Smoking status: Former     Packs/day: 0.50     Years: 26.00     Additional pack years: 0.00     Total pack years: 13.00     Types: Cigarettes     Quit date: 3/7/2009     Years since quittin.7    Smokeless tobacco: Never   Vaping Use    Vaping Use: Never used   Substance and Sexual Activity    Alcohol use: Yes     Comment: occas    Drug use: No    Sexual activity: Yes     Partners: Male   Other Topics Concern    Not on file   Social History Narrative    ** Merged History Encounter **          Social Determinants of Health     Financial Resource Strain: Low Risk  (10/3/2023)    Overall Financial Resource Strain (CARDIA)     Difficulty of Paying Living Expenses: Not hard at all   Food Insecurity: Not on file (10/3/2023)   Transportation Needs: Unknown (10/3/2023)    PRAPARE - Transportation     Lack of Transportation (Medical): Not on file     Lack of Transportation (Non-Medical):  No   Physical Activity: Not on file   Stress: Not on file   Social Connections: Not on file   Intimate Partner Violence: Not on file   Housing Stability: Unknown (10/3/2023)    Housing Stability Vital Sign     Unable to Pay for Housing in the Last Year: Not on file     Number of [address] in the Last

## 2023-12-13 NOTE — TELEPHONE ENCOUNTER
Patient scheduled for surgery with Dr. Merline Alley on 12- at 9:15am with an arrival of 7:30am.  Preop surgery instructions and surgery consent signed. Patient has antibacterial soap at home.

## 2023-12-14 NOTE — TELEPHONE ENCOUNTER
Left detailed msg on machine to call the office and let us know how the buspar 10 mg is working for anxiety   or can also send a my chart msg

## 2023-12-15 ENCOUNTER — ANESTHESIA EVENT (OUTPATIENT)
Dept: OPERATING ROOM | Age: 63
End: 2023-12-15
Payer: COMMERCIAL

## 2023-12-15 ENCOUNTER — ANESTHESIA (OUTPATIENT)
Dept: OPERATING ROOM | Age: 63
End: 2023-12-15
Payer: COMMERCIAL

## 2023-12-15 ENCOUNTER — HOSPITAL ENCOUNTER (OUTPATIENT)
Age: 63
Setting detail: OUTPATIENT SURGERY
Discharge: HOME | End: 2023-12-15
Attending: SURGERY | Admitting: SURGERY
Payer: COMMERCIAL

## 2023-12-15 VITALS
WEIGHT: 168.8 LBS | BODY MASS INDEX: 28.82 KG/M2 | HEART RATE: 71 BPM | DIASTOLIC BLOOD PRESSURE: 78 MMHG | SYSTOLIC BLOOD PRESSURE: 114 MMHG | HEIGHT: 64 IN | OXYGEN SATURATION: 100 % | RESPIRATION RATE: 16 BRPM | TEMPERATURE: 97.1 F

## 2023-12-15 DIAGNOSIS — D05.11 DUCTAL CARCINOMA IN SITU (DCIS) OF RIGHT BREAST: ICD-10-CM

## 2023-12-15 PROBLEM — Z98.890 S/P RIGHT BREAST BIOPSY: Status: ACTIVE | Noted: 2023-12-15

## 2023-12-15 PROCEDURE — 6360000002 HC RX W HCPCS: Performed by: SURGERY

## 2023-12-15 PROCEDURE — 6360000002 HC RX W HCPCS: Performed by: NURSE ANESTHETIST, CERTIFIED REGISTERED

## 2023-12-15 PROCEDURE — 2500000003 HC RX 250 WO HCPCS: Performed by: SURGERY

## 2023-12-15 PROCEDURE — 2580000003 HC RX 258: Performed by: SURGERY

## 2023-12-15 PROCEDURE — 6370000000 HC RX 637 (ALT 250 FOR IP): Performed by: SURGERY

## 2023-12-15 RX ORDER — SODIUM CHLORIDE 9 MG/ML
INJECTION, SOLUTION INTRAVENOUS CONTINUOUS
Status: DISCONTINUED | OUTPATIENT
Start: 2023-12-15 | End: 2023-12-15 | Stop reason: HOSPADM

## 2023-12-15 RX ORDER — SODIUM CHLORIDE 9 MG/ML
INJECTION, SOLUTION INTRAVENOUS PRN
Status: DISCONTINUED | OUTPATIENT
Start: 2023-12-15 | End: 2023-12-15 | Stop reason: HOSPADM

## 2023-12-15 RX ORDER — FENTANYL CITRATE 50 UG/ML
INJECTION, SOLUTION INTRAMUSCULAR; INTRAVENOUS PRN
Status: DISCONTINUED | OUTPATIENT
Start: 2023-12-15 | End: 2023-12-15 | Stop reason: SDUPTHER

## 2023-12-15 RX ORDER — IBUPROFEN 800 MG/1
800 TABLET ORAL ONCE
Status: COMPLETED | OUTPATIENT
Start: 2023-12-15 | End: 2023-12-15

## 2023-12-15 RX ORDER — LIDOCAINE HCL/PF 100 MG/5ML
SYRINGE (ML) INJECTION PRN
Status: DISCONTINUED | OUTPATIENT
Start: 2023-12-15 | End: 2023-12-15 | Stop reason: SDUPTHER

## 2023-12-15 RX ORDER — MIDAZOLAM HYDROCHLORIDE 1 MG/ML
INJECTION INTRAMUSCULAR; INTRAVENOUS PRN
Status: DISCONTINUED | OUTPATIENT
Start: 2023-12-15 | End: 2023-12-15 | Stop reason: SDUPTHER

## 2023-12-15 RX ORDER — PROPOFOL 10 MG/ML
INJECTION, EMULSION INTRAVENOUS PRN
Status: DISCONTINUED | OUTPATIENT
Start: 2023-12-15 | End: 2023-12-15 | Stop reason: SDUPTHER

## 2023-12-15 RX ORDER — SODIUM CHLORIDE 0.9 % (FLUSH) 0.9 %
5-40 SYRINGE (ML) INJECTION PRN
Status: DISCONTINUED | OUTPATIENT
Start: 2023-12-15 | End: 2023-12-15 | Stop reason: HOSPADM

## 2023-12-15 RX ORDER — SODIUM CHLORIDE 0.9 % (FLUSH) 0.9 %
5-40 SYRINGE (ML) INJECTION EVERY 12 HOURS SCHEDULED
Status: DISCONTINUED | OUTPATIENT
Start: 2023-12-15 | End: 2023-12-15 | Stop reason: HOSPADM

## 2023-12-15 RX ORDER — ACETAMINOPHEN 500 MG
1000 TABLET ORAL ONCE
Status: COMPLETED | OUTPATIENT
Start: 2023-12-15 | End: 2023-12-15

## 2023-12-15 RX ADMIN — IBUPROFEN 800 MG: 800 TABLET, FILM COATED ORAL at 09:00

## 2023-12-15 RX ADMIN — MIDAZOLAM 2 MG: 1 INJECTION INTRAMUSCULAR; INTRAVENOUS at 09:39

## 2023-12-15 RX ADMIN — SODIUM CHLORIDE: 9 INJECTION, SOLUTION INTRAVENOUS at 08:52

## 2023-12-15 RX ADMIN — ACETAMINOPHEN 1000 MG: 500 TABLET ORAL at 09:00

## 2023-12-15 RX ADMIN — Medication 2000 MG: at 09:42

## 2023-12-15 RX ADMIN — Medication 100 MG: at 09:42

## 2023-12-15 RX ADMIN — FENTANYL CITRATE 100 MCG: 50 INJECTION, SOLUTION INTRAMUSCULAR; INTRAVENOUS at 09:41

## 2023-12-15 RX ADMIN — PROPOFOL 100 MG: 10 INJECTION, EMULSION INTRAVENOUS at 09:44

## 2023-12-15 ASSESSMENT — PAIN - FUNCTIONAL ASSESSMENT
PAIN_FUNCTIONAL_ASSESSMENT: 0-10

## 2023-12-15 ASSESSMENT — PAIN SCALES - GENERAL: PAINLEVEL_OUTOF10: 0

## 2023-12-15 ASSESSMENT — PAIN DESCRIPTION - DESCRIPTORS: DESCRIPTORS: ACHING

## 2023-12-15 NOTE — H&P
H and P for [address]  History and Physical Update    Pt Name: Ricky Warner  MRN: [id number]  YOB: 1960  Date of evaluation: 12/15/2023    [x] I have examined the patient and reviewed the H&P/Consult and there are no changes to the patient or plans.     [] I have examined the patient and reviewed the H&P/Consult and have noted the following changes:        Shyla Medrano MD  Electronically signed 12/15/2023 at 9:24 AM

## 2023-12-15 NOTE — PROGRESS NOTES
Patient return to [facility] room 8. VS stable. Pain level assessed. Updated patient and family on discharge planning. Patient has snack and drink. No further questions or concerns voiced at this time. Side rails up times 2. Call light in reach.

## 2023-12-15 NOTE — OP NOTE
[address]  Operative Report    PATIENT NAME: Nya [address] RECORD NO. [id number]  SURGEON: Camilo Nieves MD MD FACS  Primary Care Physician: MARY ALICE Contreras - CNP  Date: 12/15/2023, 10:03 AM     PROCEDURE PERFORMED: re excision skin margin right breast  PREOPERATIVE DIAGNOSIS:   Active Hospital Problems    Diagnosis Date Noted    Ductal carcinoma in situ (DCIS) of right breast [D05.11] 11/26/2023      POSTOPERATIVE DIAGNOSIS: Same, path pending  SURGEON:  Camilo Nieves MD MD FACS  ANESTHESIA:  Monitored Local Anesthesia with Sedation and local  ANESTHESIA:  7 OF 0.5% Marcaine and 1% xylocaine in equal parts  ESTIMATED BLOOD LOSS:  0  ml  SPECIMEN: new skin margin  COMPLICATIONS:  None; patient tolerated the procedure well. DRAINS: none  DISPOSITION: Outpatient Surgery  CONDITION: stable      Narrative:    Patient brought to the operating room placed supine SCDs in place thanks on the chart Preven back for given. The right breast correctly identified prepped with ChloraPrep 3 minutes allowed to pass draped sterilely. After adequate sedation the previous incision was opened with a 15 blade after local anesthesia administration. Seroma fluid aspirated and the skin margin area identified 1 cm thickness reexcised oriented with a new silk suture. Hemostasis accomplished electrocautery. This point deep tissue closed with 3-0 Vicryl skin was closed with 4-0 Vicryl subcuticular stitch. Skin affix glue applied.   Patient brought back to recovery room stable condition

## 2023-12-15 NOTE — DISCHARGE INSTR - DIET

## 2023-12-15 NOTE — PROGRESS NOTES
Pt has met discharge criteria and states she is ready for discharge to home. IV removed, gauze and tape applied. Dressed in own clothes and personal belongings gathered. Discharge instructions (with opioid medication education information) given to pt and family; pt and family verbalized understanding of discharge instructions, prescriptions and follow up appointments. Pt transported to discharge lobby by Erin Yoo Principal [facility] staff.

## 2023-12-15 NOTE — DISCHARGE INSTRUCTIONS
[address] [facility] INSTRUCTIONS    Pt Name: Nya [address] Record Number: [id number]  Today's Date: 12/15/2023           GENERAL ANESTHESIA OR SEDATION:    [x]  Do not drive or operate hazardous machinery for 24 hours. [x] Do not make important business or personal decisions for 24 hours. [x] Do not drink alcoholic beverages or use tobacco for 24 hours. ACTIVITY INSTRUCTIONS:    [] Rest today. Resume light to normal activity tomorrow. [x] You may resume normal activity tomorrow. Do not engage in strenuous activity that may place stress on your incision. [x] Do not drive  UNTIL NO LONGER TAKING NARCOTICS AND DAILY ACTIVITIES  ARE NEARLY NORMAL     [x]Avoid heavy lifting, tugging, pullings greater  than  25 lbs until seen in the office. DIET INSTRUCTIONS:    []Begin with clear liquids. If not nauseated, may increase to a low-fat diet when you desire. Greasy and spicy foods are not advised. [x]Regular diet as tolerated. []Other:          MEDICATIONS  [x]Prescription sent with you to be used as directed. []Lortab   [x]None   []Percocet   []Tylenol #3   []NONE              []Antibiotic              []Tramadol       Do not drink alcohol or drive while taking these medications. You may experience dizziness or drowsiness with these medications. You may also experience constipation which can be relieved with stool softners or laxatives. [x]You may resume your daily prescription medication schedule unless otherwise specified. []Do not take 325mg Aspirin or other blood thinners such as Coumadin or Plavix for 5 days. WOUND/DRESSING INSTRUCTIONS:    Always ensure you and your care giver clean hands before and after caring for the wound. [] Keep dressing clean and dry for 24 hours. [] Allow steri-strips to fall off on their own.   [] Ice operative site for 20 minutes 4 times a day.      [x] May wash over incision in shower,

## 2023-12-15 NOTE — ANESTHESIA POSTPROCEDURE EVALUATION
Department of Anesthesiology  Postprocedure Note    Patient: Kat Oquendo  MRN: [id number]  YOB: 1960  Date of evaluation: 12/15/2023    Procedure Summary       Date: 12/15/23 Room / Location: [facility]    Anesthesia Start: 0939 Anesthesia Stop: 1001    Procedure: Re-Excision Right Breast Margins (Right: Breast) Diagnosis:       Ductal carcinoma in situ (DCIS) of right breast      (Ductal carcinoma in situ (DCIS) of right breast [D05.11])    Surgeons: Emre Ordonez MD Responsible Provider: Jd Vance DO    Anesthesia Type: TIVA ASA Status: 3            Anesthesia Type: No value filed. Stuart Phase I: Stuart Score: 10    Stuart Phase II:      Anesthesia Post Evaluation    Patient location during evaluation: bedside  Patient participation: complete - patient participated  Level of consciousness: awake  Airway patency: patent  Nausea & Vomiting: no vomiting and no nausea  Cardiovascular status: hemodynamically stable  Respiratory status: acceptable  Hydration status: stable  Pain management: adequate        No notable events documented.

## 2023-12-15 NOTE — PROGRESS NOTES
Patient oriented to Same Day department and admitted to Same Day Surgery room 8. Patient verbalized approval for first name, last initial with physician name on unit whiteboard. Plan of care reviewed with patient. Patient room whiteboard filled out and discussed with patient and responsible adult. Patient and responsible adult offered Same Day Welcome Packet to review. Call light in reach. Bed in lowest position, locked, with one bed rail up. SCDs and warming blanket in place. Appropriate arm bands on patient. Bathroom offered. All questions and concerns of patient addressed. Meds to Beds:   Patient informed of . [facility] to [facility] program during admission.  Patient is agreeable to program.   Contact information for the pharmacy and the Meds to Beds program:   Name: Leona Flower   Relationship to patient:spouse/significant other   Phone number: [phone]

## 2023-12-18 ENCOUNTER — HOSPITAL ENCOUNTER (OUTPATIENT)
Dept: INFUSION THERAPY | Age: 63
Discharge: HOME | End: 2023-12-18
Payer: COMMERCIAL

## 2023-12-18 ENCOUNTER — OFFICE VISIT (OUTPATIENT)
Dept: ONCOLOGY | Age: 63
End: 2023-12-18
Payer: COMMERCIAL

## 2023-12-18 VITALS
HEART RATE: 85 BPM | RESPIRATION RATE: 16 BRPM | DIASTOLIC BLOOD PRESSURE: 68 MMHG | TEMPERATURE: 98.2 F | SYSTOLIC BLOOD PRESSURE: 106 MMHG

## 2023-12-18 VITALS
DIASTOLIC BLOOD PRESSURE: 68 MMHG | RESPIRATION RATE: 16 BRPM | HEIGHT: 64 IN | SYSTOLIC BLOOD PRESSURE: 106 MMHG | BODY MASS INDEX: 29.71 KG/M2 | TEMPERATURE: 98.2 F | WEIGHT: 174 LBS | HEART RATE: 85 BPM | OXYGEN SATURATION: 97 %

## 2023-12-18 DIAGNOSIS — D05.11 DUCTAL CARCINOMA IN SITU (DCIS) OF RIGHT BREAST: Primary | ICD-10-CM

## 2023-12-18 PROCEDURE — 99211 OFF/OP EST MAY X REQ PHY/QHP: CPT

## 2023-12-18 PROCEDURE — 99205 OFFICE O/P NEW HI 60 MIN: CPT | Performed by: INTERNAL MEDICINE

## 2023-12-18 NOTE — PATIENT INSTRUCTIONS
Bone density testing ordered  Radiation oncology consult for adjuvant RT.   Follow up visit to initiate AI therapy ( after completion of RT, after 2 weeks)

## 2024-01-05 ENCOUNTER — HOSPITAL ENCOUNTER (OUTPATIENT)
Dept: RADIATION ONCOLOGY | Age: 64
Discharge: HOME | End: 2024-01-05
Payer: COMMERCIAL

## 2024-01-05 VITALS
SYSTOLIC BLOOD PRESSURE: 119 MMHG | BODY MASS INDEX: 29.19 KG/M2 | HEIGHT: 64 IN | RESPIRATION RATE: 18 BRPM | HEART RATE: 72 BPM | WEIGHT: 171 LBS | DIASTOLIC BLOOD PRESSURE: 73 MMHG | OXYGEN SATURATION: 98 % | TEMPERATURE: 98.1 F

## 2024-01-05 PROCEDURE — 99204 OFFICE O/P NEW MOD 45 MIN: CPT | Performed by: RADIOLOGY

## 2024-01-05 PROCEDURE — 99202 OFFICE O/P NEW SF 15 MIN: CPT | Performed by: RADIOLOGY

## 2024-01-05 ASSESSMENT — ENCOUNTER SYMPTOMS
NAUSEA: 0
RECTAL PAIN: 0
COUGH: 1
BACK PAIN: 1
ABDOMINAL PAIN: 0
SHORTNESS OF BREATH: 0
DIARRHEA: 0
TROUBLE SWALLOWING: 0
BLOOD IN STOOL: 0

## 2024-01-05 NOTE — PROGRESS NOTES
Radiation Oncology Consultation  Encounter Date: 2024     Ms. Adriana Constantino is a 63 y.o. female  : 1960  MRN: [id number]  Acct Number: [id number]  Requesting Provider: Dr. DARIEN Ferrari     Reason for request: DCIS      CONSULTANT: Clemencia Ahumada PhD MD      CHIEF COMPLAINT: Noninvasive breast cancer  DIAGNOSIS: D05.11 -- Intraductal carcinoma in situ of right female breast, Grade 1; pTis N0 M0, Stage 0; ER+(100%, strong); NE+(100%, strong) [multifocal]  Date of diagnosis: 2023      ASSESSMENT:  Low-grade multifocal DCIS of the right breast ER/NE positive, status post lumpectomy followed by reexcision as noted above.  The diagnosis including AJCC staging was reviewed.  Adriana received a copy of her AJCC staging information.  Treatment options and recommendations including current clinical practice guidelines (NCCN) were reviewed in detail.  We reviewed recommendations for initial diagnostic workup, local/regional treatment options, systemic therapy recommendations and surveillance recommendations.  The standard recommendation is for whole breast radiation therapy with consideration of a \"boost\" to the tumor bed.  Other considerations can be accelerated partial breast irradiation or omission of radiation therapy.  For Adriana, the 5-year local recurrence risk is 2% with radiation therapy and 4% without radiation therapy assuming she is compliant with adjuvant antiestrogen therapy.  For accelerated partial breast irradiation, the 5-year local recurrence risk is 2% at the lumpectomy site and 2% elsewhere on the breast.  Because accelerated partial breast radiation gives local control equivalent with no radiation therapy I would not recommend APBI in this case.  The choices are either to proceed with or declined radiation therapy.  I discussed this at length with Adriana.  Because she unexpectedly had multifocal disease she does not feel comfortable with omitting radiation therapy and wishes to

## 2024-01-10 ENCOUNTER — SOCIAL WORK (OUTPATIENT)
Dept: RADIATION ONCOLOGY | Age: 64
End: 2024-01-10

## 2024-01-10 ENCOUNTER — HOSPITAL ENCOUNTER (OUTPATIENT)
Dept: RADIATION ONCOLOGY | Age: 64
Discharge: HOME | End: 2024-01-10
Payer: COMMERCIAL

## 2024-01-10 ENCOUNTER — HOSPITAL ENCOUNTER (OUTPATIENT)
Dept: CT IMAGING | Age: 64
Discharge: HOME | End: 2024-01-10

## 2024-01-10 DIAGNOSIS — D05.11 INTRADUCTAL CARCINOMA IN SITU OF RIGHT BREAST: ICD-10-CM

## 2024-01-10 PROCEDURE — 77334 RADIATION TREATMENT AID(S): CPT | Performed by: RADIOLOGY

## 2024-01-10 PROCEDURE — 77290 THER RAD SIMULAJ FIELD CPLX: CPT | Performed by: RADIOLOGY

## 2024-01-10 PROCEDURE — 3209999900 CT GUIDE RADIATION THERAPY NO CHARGE

## 2024-01-10 NOTE — PROGRESS NOTES
Oncology Social Work    Date: 1/10/2024  Time: 12:18 PM  Name: Adriana Constantino  MRN: [id number]     Contact Type: Follow-up    Note:   Situation: This staff called Adriana Constantino via phone support to introduce myself as her Oncology Social Worker.     Background:  Adriana had her recent appointment at the Radiation Dept of the [facility]. This staff was calling to review her Distress Thermometer completed with her nurse during this encounter.     Assessment:  The contact number provided to this staff and Medical Records is her number as identified as such in the voicemail recording. Since the call went immediately to a voicemail, a message was left regarding the purpose of the call.   - Education regarding the services was provided on the recorded message from the SW.  - No community referrals were placed at this time because this staff has no indication of where Adriana is in her treatment plan. Referral services may be reconsidered should she express needs regarding assistance.     Recommendation: Follow-up will be initiated by Adriana based on need should she choose to return a call to the identified number.  was able to provide her with my contact information and will remain available for support.         MOHAN Llanes, KIMBER, MANDY  Oncology Social Worker      Electronically signed by MOHAN Llanes LSW, MANDY on 1/10/2024 at 12:18 PM

## 2024-01-15 ENCOUNTER — TELEPHONE (OUTPATIENT)
Dept: SPIRITUAL SERVICES | Facility: CLINIC | Age: 64
End: 2024-01-15

## 2024-01-16 ENCOUNTER — HOSPITAL ENCOUNTER (OUTPATIENT)
Dept: RADIATION ONCOLOGY | Age: 64
End: 2024-01-16
Payer: COMMERCIAL

## 2024-01-16 PROCEDURE — 77334 RADIATION TREATMENT AID(S): CPT | Performed by: RADIOLOGY

## 2024-01-18 ENCOUNTER — HOSPITAL ENCOUNTER (OUTPATIENT)
Dept: RADIATION ONCOLOGY | Age: 64
Discharge: HOME | End: 2024-01-18
Payer: COMMERCIAL

## 2024-01-18 VITALS
SYSTOLIC BLOOD PRESSURE: 136 MMHG | OXYGEN SATURATION: 99 % | HEART RATE: 78 BPM | TEMPERATURE: 98.3 F | RESPIRATION RATE: 18 BRPM | BODY MASS INDEX: 29.01 KG/M2 | DIASTOLIC BLOOD PRESSURE: 83 MMHG | WEIGHT: 169 LBS

## 2024-01-18 PROCEDURE — 77387 GUIDANCE FOR RADJ TX DLVR: CPT | Performed by: RADIOLOGY

## 2024-01-18 PROCEDURE — 77412 RADIATION TX DELIVERY LVL 3: CPT | Performed by: RADIOLOGY

## 2024-01-18 NOTE — PROGRESS NOTES
Alteration  Cough: Nonproductive  Hemoptysis: None  Dyspnea: Normal  Mucous Quantity/Quality:     Additional Comments: Teaching done today.    MEDICATIONS:     Current Outpatient Medications   Medication Sig Dispense Refill    omeprazole (PRILOSEC) 20 MG delayed release capsule Take 1 capsule by mouth 2 times daily 90 capsule 4    busPIRone (BUSPAR) 10 MG tablet Take 1 tablet by mouth 2 times daily 180 tablet 4    buPROPion (WELLBUTRIN XL) 300 MG extended release tablet Take 1 tablet by mouth every morning 90 tablet 4    atorvastatin (LIPITOR) 20 MG tablet Take 1 tablet by mouth daily 90 tablet 4    metFORMIN (GLUCOPHAGE) 1000 MG tablet Take 1 tablet by mouth 2 times daily (with meals) 180 tablet 1    b complex vitamins capsule Take 1 capsule by mouth daily      CALCIUM-MAGNESIUM-VITAMIN D PO Take by mouth      Multiple Vitamin (MULTI-VITAMIN DAILY PO) Take by mouth       No current facility-administered medications for this encounter.     * New    PHYSICAL EXAM:       ECO - Asymptomatic (Fully active, able to carry on all pre-disease activities without restriction)    General: NAD, AO x 3, Mentation is clear with appropriate affect.  HEENT: Normocephalic, atraumatic  Thorax:  Unlabored  COR: Regular rate and rhythm  Breasts:  Right breast skin non-erythematous, stable.  Neuro:  Cranial nerves grossly intact; no focal deficits  Skin - treatment portal: SEE above.  Skin intact with no treatment effects noted.    Chemotherapy Update: Adjuvant hormonal therapy to follow Radiation therapy    Treatment Imaging: Kv Pair and Port Film    ASSESSMENT: No significant radiation side effects.     New medications, diagnostic results: Not applicable    PLAN: Again reviewed potential side effects of radiation for the patient's treatment.    Continue local/topical care.    Continue current radiation course as prescribed.

## 2024-01-18 NOTE — PROGRESS NOTES
[facility]  [address] [id number]  Phone: [phone]   Toll Free: [phone]   Fax: [phone]    RADIATION ONCOLOGY EDUCATION    CHIEF COMPLAINT: Adriana presents to radiation oncology today for education regarding treatment to the R Breast.      PLAN:   Expected and potential side effects were discussed in detail, along with written handouts.  Skin care and moisturization instructions were discussed in detail.   Patient was not agreeable to physical therapy referral. Explained referral could be placed at any time if patient changes their mind.   Patient was informed of dietician that is available weekly and as needed.  Educated on weekly on treatment visit to meet with Physician to monitor side effects and treatment course.  Informed patient that Physician is available at any time to discuss radiation side effects/concerns through out treatment course.  Adriana had the opportunity to ask questions, and indicated that all questions were satisfactorily addressed.

## 2024-01-19 ENCOUNTER — HOSPITAL ENCOUNTER (OUTPATIENT)
Dept: RADIATION ONCOLOGY | Age: 64
Discharge: HOME | End: 2024-01-19
Payer: COMMERCIAL

## 2024-01-19 PROBLEM — E11.9 TYPE 2 DIABETES MELLITUS (HCC): Status: RESOLVED | Noted: 2023-12-13 | Resolved: 2024-01-19

## 2024-01-19 PROCEDURE — 77387 GUIDANCE FOR RADJ TX DLVR: CPT | Performed by: RADIOLOGY

## 2024-01-19 PROCEDURE — 77412 RADIATION TX DELIVERY LVL 3: CPT | Performed by: RADIOLOGY

## 2024-01-22 ENCOUNTER — HOSPITAL ENCOUNTER (OUTPATIENT)
Dept: RADIATION ONCOLOGY | Age: 64
Discharge: HOME | End: 2024-01-22
Payer: COMMERCIAL

## 2024-01-22 PROCEDURE — G6002 STEREOSCOPIC X-RAY GUIDANCE: HCPCS | Performed by: RADIOLOGY

## 2024-01-22 PROCEDURE — 77412 RADIATION TX DELIVERY LVL 3: CPT | Performed by: RADIOLOGY

## 2024-01-22 PROCEDURE — 77387 GUIDANCE FOR RADJ TX DLVR: CPT | Performed by: RADIOLOGY

## 2024-01-23 ENCOUNTER — HOSPITAL ENCOUNTER (OUTPATIENT)
Dept: RADIATION ONCOLOGY | Age: 64
Discharge: HOME | End: 2024-01-23
Payer: COMMERCIAL

## 2024-01-23 PROCEDURE — 77412 RADIATION TX DELIVERY LVL 3: CPT | Performed by: RADIOLOGY

## 2024-01-23 PROCEDURE — 77387 GUIDANCE FOR RADJ TX DLVR: CPT | Performed by: RADIOLOGY

## 2024-01-23 PROCEDURE — G6002 STEREOSCOPIC X-RAY GUIDANCE: HCPCS | Performed by: RADIOLOGY

## 2024-01-24 ENCOUNTER — HOSPITAL ENCOUNTER (OUTPATIENT)
Dept: RADIATION ONCOLOGY | Age: 64
Discharge: HOME | End: 2024-01-24
Payer: COMMERCIAL

## 2024-01-24 PROCEDURE — 77412 RADIATION TX DELIVERY LVL 3: CPT | Performed by: RADIOLOGY

## 2024-01-24 PROCEDURE — 77387 GUIDANCE FOR RADJ TX DLVR: CPT | Performed by: RADIOLOGY

## 2024-01-24 PROCEDURE — G6002 STEREOSCOPIC X-RAY GUIDANCE: HCPCS | Performed by: RADIOLOGY

## 2024-01-25 ENCOUNTER — HOSPITAL ENCOUNTER (OUTPATIENT)
Dept: RADIATION ONCOLOGY | Age: 64
Discharge: HOME | End: 2024-01-25
Payer: COMMERCIAL

## 2024-01-25 VITALS
TEMPERATURE: 97.7 F | DIASTOLIC BLOOD PRESSURE: 70 MMHG | BODY MASS INDEX: 29.14 KG/M2 | OXYGEN SATURATION: 99 % | HEART RATE: 78 BPM | RESPIRATION RATE: 16 BRPM | WEIGHT: 169.75 LBS | SYSTOLIC BLOOD PRESSURE: 116 MMHG

## 2024-01-25 PROCEDURE — 77412 RADIATION TX DELIVERY LVL 3: CPT | Performed by: RADIOLOGY

## 2024-01-25 PROCEDURE — 77387 GUIDANCE FOR RADJ TX DLVR: CPT | Performed by: RADIOLOGY

## 2024-01-25 NOTE — PROGRESS NOTES
[facility]          [address]        [place] [address]        O: [phone]        F: [phone]       [place]            Dr. Raghavendra Fernandez MD MS          Dr. Clemencia Ahumada MD PhD    ON TREATMENT VISIT (OTV) NOTE     Date of Service: 2024  Patient ID: Adriana Constantino   : 1960  MRN: [id number]   Acct Number: [id number]     RADIATION ONCOLOGY ATTENDING:  Clemencia Ahumada PhD MD    DIAGNOSIS:   Cancer Staging   Ductal carcinoma in situ (DCIS) of right breast  Staging form: Breast, AJCC 8th Edition  - Clinical stage from 2023: Stage 0 (cTis (DCIS), cN0, cM0, G1, ER+, [place]+, HER2: Not Assessed) - Signed by Raghavendra Rosas MD on 2023  - Pathologic stage from 2023: Stage 0 (pTis (DCIS), cN0, cM0, G1, ER+, CA+, HER2: Not Assessed) - Signed by Raghavendra Rosas MD on 2023      Treatment Area: Breast     Current Total Dose(cGy): 1602  Current Fraction: 615  Final/Cumulative Rx. Dose (cGy): 4005    Patient was seen today for weekly visit.     Wt Readings from Last 3 Encounters:   24 77 kg (169 lb 12.1 oz)   24 76.7 kg (169 lb)   24 77.6 kg (171 lb)       /70   Pulse 78   Temp 97.7 °F (36.5 °C) (Infrared)   Resp 16   Wt 77 kg (169 lb 12.1 oz)   SpO2 99%   BMI 29.14 kg/m²     Lab Results   Component Value Date    WBC 7.1 2023    HGB 14.0 2023    HCT 42.9 2023     2023       Comfort Alteration  Fatigue:Able to perform daily activities with rest periods    Pain Location: Denies  Pain Intensity (Current): 0 No Pain  Pain Treatment: No treatment scheduled  Pain Relief: n/a  Hot Flashes/Flushes: None    Emotional Alteration:   Coping: effective    Nutritional Alteration  Anorexia: none   Nausea: No nausea noted  Vomiting: No vomiting   Dyspepsia/Heartburn: None    Skin Alteration   Skin reaction: Faint or dull erythema; follicular

## 2024-01-26 ENCOUNTER — HOSPITAL ENCOUNTER (OUTPATIENT)
Dept: RADIATION ONCOLOGY | Age: 64
Discharge: HOME | End: 2024-01-26
Payer: COMMERCIAL

## 2024-01-26 PROCEDURE — 77387 GUIDANCE FOR RADJ TX DLVR: CPT | Performed by: RADIOLOGY

## 2024-01-26 PROCEDURE — 77412 RADIATION TX DELIVERY LVL 3: CPT | Performed by: RADIOLOGY

## 2024-01-29 ENCOUNTER — APPOINTMENT (OUTPATIENT)
Dept: RADIATION ONCOLOGY | Age: 64
End: 2024-01-29
Payer: COMMERCIAL

## 2024-01-29 ENCOUNTER — HOSPITAL ENCOUNTER (OUTPATIENT)
Dept: WOMENS IMAGING | Age: 64
Discharge: HOME | End: 2024-01-29
Attending: INTERNAL MEDICINE
Payer: COMMERCIAL

## 2024-01-29 DIAGNOSIS — D05.11 DUCTAL CARCINOMA IN SITU (DCIS) OF RIGHT BREAST: ICD-10-CM

## 2024-01-29 PROCEDURE — 77412 RADIATION TX DELIVERY LVL 3: CPT | Performed by: RADIOLOGY

## 2024-01-29 PROCEDURE — G6002 STEREOSCOPIC X-RAY GUIDANCE: HCPCS | Performed by: RADIOLOGY

## 2024-01-29 PROCEDURE — 77387 GUIDANCE FOR RADJ TX DLVR: CPT | Performed by: RADIOLOGY

## 2024-01-29 PROCEDURE — 77080 DXA BONE DENSITY AXIAL: CPT

## 2024-01-29 NOTE — DISCHARGE INSTRUCTIONS
PATIENT DISCHARGE INSTRUCTIONS    Remember that side effects present at the end of your treatments will improve within a few weeks after the last treatment.  Eat well balanced meals even though your treatments are finished.  This will help speed the healing process. Continue any special diets prescribed to control side effects until these side effects have been resolved.  Get plenty of rest.  If you have experienced fatigue and/or weakness, this may continue for several weeks after your last treatment.  Continue with your daily activities according to the way you feel.  Continue to be gentle with your skin.  Follow your present skin care instructions until your follow-up visit.  IF YOU DEVELOP ANY CHANGES IN YOUR SKIN IN THE AREA TREATED WITH RADIATION, PLEASE CALL THE RADIATION ONCOLOGY NURSE -[phone].  Protect your skin from any injury and avoid direct sun exposure in the treatment area.  The skin in the treated area may always be more sensitive than the rest of your skin.  Always use SPF 30 or higher sun block if you will be in the sun and cannot avoid exposure.  Please contact your referring physician for a follow-up appointment in addition to your Radiation Oncology appointment.  You may receive a survey via text message or e-mail at some point after treatment. We would appreciate your time in filling out this survey and giving us your honest feedback about your experience with us. We strive for excellence and hope that you were \"Very Satisfied\" with your care and our service.    Presence of pain:   Medication Taper: No    See Instructions Dated: N/A  Follow up orders: Will be discussed at Follow-Up

## 2024-01-30 ENCOUNTER — APPOINTMENT (OUTPATIENT)
Dept: RADIATION ONCOLOGY | Age: 64
End: 2024-01-30
Payer: COMMERCIAL

## 2024-01-30 PROCEDURE — 77412 RADIATION TX DELIVERY LVL 3: CPT | Performed by: RADIOLOGY

## 2024-01-30 PROCEDURE — G6002 STEREOSCOPIC X-RAY GUIDANCE: HCPCS | Performed by: RADIOLOGY

## 2024-01-30 PROCEDURE — 77387 GUIDANCE FOR RADJ TX DLVR: CPT | Performed by: RADIOLOGY

## 2024-01-31 ENCOUNTER — APPOINTMENT (OUTPATIENT)
Dept: RADIATION ONCOLOGY | Age: 64
End: 2024-01-31
Payer: COMMERCIAL

## 2024-01-31 PROCEDURE — 77387 GUIDANCE FOR RADJ TX DLVR: CPT | Performed by: RADIOLOGY

## 2024-01-31 PROCEDURE — 77412 RADIATION TX DELIVERY LVL 3: CPT | Performed by: RADIOLOGY

## 2024-01-31 PROCEDURE — G6002 STEREOSCOPIC X-RAY GUIDANCE: HCPCS | Performed by: RADIOLOGY

## 2024-02-01 ENCOUNTER — HOSPITAL ENCOUNTER (OUTPATIENT)
Dept: RADIATION ONCOLOGY | Age: 64
Discharge: HOME | End: 2024-02-01
Payer: COMMERCIAL

## 2024-02-01 VITALS
OXYGEN SATURATION: 96 % | WEIGHT: 168.21 LBS | DIASTOLIC BLOOD PRESSURE: 79 MMHG | TEMPERATURE: 98.4 F | SYSTOLIC BLOOD PRESSURE: 119 MMHG | RESPIRATION RATE: 16 BRPM | BODY MASS INDEX: 28.87 KG/M2 | HEART RATE: 85 BPM

## 2024-02-01 PROCEDURE — 77412 RADIATION TX DELIVERY LVL 3: CPT | Performed by: RADIOLOGY

## 2024-02-01 PROCEDURE — 77387 GUIDANCE FOR RADJ TX DLVR: CPT | Performed by: RADIOLOGY

## 2024-02-01 NOTE — PROGRESS NOTES
[facility]          [address], [address]        [place], [place] [address]        O: [phone]        F: [phone]       [employer]            Dr. Raghavendra Fernandez MD MS          Dr. Clemencia Ahumada MD PhD    ON TREATMENT VISIT (OTV) NOTE     Date of Service: 2024  Patient ID: Adriana Constantino   : 1960  MRN: [id number]   Acct Number: [id number]     RADIATION ONCOLOGY ATTENDING:  Clemencia Ahumada PhD MD    DIAGNOSIS:   Cancer Staging   Ductal carcinoma in situ (DCIS) of right breast  Staging form: Breast, AJCC 8th Edition  - Clinical stage from 2023: Stage 0 (cTis (DCIS), cN0, cM0, G1, ER+, SC+, HER2: Not Assessed) - Signed by Raghavendra Rosas MD on 2023  - Pathologic stage from 2023: Stage 0 (pTis (DCIS), cN0, cM0, G1, ER+, SC+, HER2: Not Assessed) - Signed by Raghavendra Rosas MD on 2023      Treatment Area: Breast     Current Total Dose(cGy): 2937  Current Fraction: 11/15  Final/Cumulative Rx. Dose (cGy): 4005    Patient was seen today for weekly visit.     Wt Readings from Last 3 Encounters:   24 77 kg (169 lb 12.1 oz)   24 76.7 kg (169 lb)   24 77.6 kg (171 lb)       There were no vitals taken for this visit.    Lab Results   Component Value Date    WBC 7.1 2023    HGB 14.0 2023    HCT 42.9 2023     2023       Comfort Alteration  Fatigue:Able to perform daily activities with rest periods    Pain Location: Denies  Pain Intensity (Current): 0 No Pain  Pain Treatment: No treatment scheduled  Pain Relief: n/a  Hot Flashes/Flushes: None    Emotional Alteration:   Coping: effective    Nutritional Alteration  Anorexia: none   Nausea: No nausea noted  Vomiting: No vomiting   Dyspepsia/Heartburn: None    Skin Alteration   Skin reaction: Bright erythema    Ventilation Alteration  Cough: Nonproductive  Hemoptysis: None  Dyspnea: Normal  Mucous

## 2024-02-02 ENCOUNTER — APPOINTMENT (OUTPATIENT)
Dept: RADIATION ONCOLOGY | Age: 64
End: 2024-02-02
Payer: COMMERCIAL

## 2024-02-05 ENCOUNTER — HOSPITAL ENCOUNTER (OUTPATIENT)
Dept: RADIATION ONCOLOGY | Age: 64
Discharge: HOME | End: 2024-02-05
Payer: COMMERCIAL

## 2024-02-05 ENCOUNTER — OFFICE VISIT (OUTPATIENT)
Dept: ONCOLOGY | Age: 64
End: 2024-02-05
Payer: COMMERCIAL

## 2024-02-05 ENCOUNTER — HOSPITAL ENCOUNTER (OUTPATIENT)
Dept: INFUSION THERAPY | Age: 64
Discharge: HOME | End: 2024-02-05
Payer: COMMERCIAL

## 2024-02-05 VITALS
DIASTOLIC BLOOD PRESSURE: 72 MMHG | OXYGEN SATURATION: 97 % | SYSTOLIC BLOOD PRESSURE: 120 MMHG | WEIGHT: 169 LBS | RESPIRATION RATE: 16 BRPM | BODY MASS INDEX: 28.85 KG/M2 | HEART RATE: 82 BPM | TEMPERATURE: 97.9 F | HEIGHT: 64 IN

## 2024-02-05 VITALS
HEART RATE: 82 BPM | SYSTOLIC BLOOD PRESSURE: 120 MMHG | DIASTOLIC BLOOD PRESSURE: 72 MMHG | OXYGEN SATURATION: 97 % | RESPIRATION RATE: 16 BRPM | TEMPERATURE: 97.9 F

## 2024-02-05 DIAGNOSIS — D05.11 DUCTAL CARCINOMA IN SITU (DCIS) OF RIGHT BREAST: Primary | ICD-10-CM

## 2024-02-05 PROCEDURE — 99214 OFFICE O/P EST MOD 30 MIN: CPT | Performed by: INTERNAL MEDICINE

## 2024-02-05 PROCEDURE — 99211 OFF/OP EST MAY X REQ PHY/QHP: CPT

## 2024-02-05 RX ORDER — ONDANSETRON 2 MG/ML
8 INJECTION INTRAMUSCULAR; INTRAVENOUS
Status: CANCELLED | OUTPATIENT
Start: 2024-02-26

## 2024-02-05 RX ORDER — DIPHENHYDRAMINE HYDROCHLORIDE 50 MG/ML
50 INJECTION INTRAMUSCULAR; INTRAVENOUS
Status: CANCELLED | OUTPATIENT
Start: 2024-02-26

## 2024-02-05 RX ORDER — EPINEPHRINE 1 MG/ML
0.3 INJECTION, SOLUTION, CONCENTRATE INTRAVENOUS PRN
Status: CANCELLED | OUTPATIENT
Start: 2024-02-26

## 2024-02-05 RX ORDER — ALBUTEROL SULFATE 90 UG/1
4 AEROSOL, METERED RESPIRATORY (INHALATION) PRN
Status: CANCELLED | OUTPATIENT
Start: 2024-02-26

## 2024-02-05 RX ORDER — SODIUM CHLORIDE 9 MG/ML
INJECTION, SOLUTION INTRAVENOUS CONTINUOUS
Status: CANCELLED | OUTPATIENT
Start: 2024-02-26

## 2024-02-05 RX ORDER — ANASTROZOLE 1 MG/1
1 TABLET ORAL DAILY
Qty: 30 TABLET | Refills: 5 | Status: SHIPPED | OUTPATIENT
Start: 2024-02-05

## 2024-02-05 RX ORDER — FAMOTIDINE 10 MG/ML
20 INJECTION, SOLUTION INTRAVENOUS
Status: CANCELLED | OUTPATIENT
Start: 2024-02-26

## 2024-02-05 RX ORDER — ACETAMINOPHEN 325 MG/1
650 TABLET ORAL
Status: CANCELLED | OUTPATIENT
Start: 2024-02-26

## 2024-02-05 NOTE — PROGRESS NOTES
[facility] [place] [facility]  [facility]  [address]  [address]  [place] [id number]  Dept: [phone]  Loc: [phone]     Adriana Constantino  1960   No ref. provider found   Ward Gamboa, APRN - CNP           CHIEF COMPLAINT  Chief Complaint   Patient presents with    Follow-up     Ductal carcinoma in situ (DCIS) of right breast          HISTORY OF PRESENT ILLNESS  Ms. Adriana Constantino is a pleasant  63 y.o lady who is here for initial consultation for ductal carcinoma in situ of the right breast status post lumpectomy and re-excision of the surgical pramod.Core biopsy ( 23) with findings of complex intracystic papillary lesion with ductal carcinoma in situ, estrogen receptor positive, progesterone receptor positive.Right lumpectomy was performed ( 23)  path with findings of  Multifocal low-grade ductal carcinoma in situ, with margin less than 1mm,re- resection was performed on 12/15/2023 with pathology will findings of negative for carcinoma or in -situ findings, rare atypical ductal proliferation.  She denies breast changes, breast lumps, nipple changes ,nipple discharge. Post operative recovery un eventful.    Pertinent medical history:  Arthritis  Cerebral artery occlusion with cerebral infarction  Gastroesophageal reflux disease  Subarachnoid hemorrhage      Pertinent surgical history:  Blepharoplasty   colonoscopy    Family history :  Breast cancer in her maternal grandmother at the age of 65  Lung cancer in her father, maternal uncle (3 of them)  Lung cancer in her sister (age 65)  Uterine cancer in her maternal grandmother      Gynecological history:  Age of menarche: 13 years  Menopause: Early 50s  Hormone replacement therapy: None  Oral contraceptive pills: 9 years  Children  2 para 2  Age 41 years  Age 38 years    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

## 2024-02-05 NOTE — PATIENT INSTRUCTIONS
Provided prescriptions for anastrozole 1 mg daily, start date: feb 26/24.  Please provide dental clearance form for prolia.  Follow up visit with MD to review the side effect of anastrozole 1 mg daily.( 3/11/24)

## 2024-02-06 ENCOUNTER — APPOINTMENT (OUTPATIENT)
Dept: RADIATION ONCOLOGY | Age: 64
End: 2024-02-06
Payer: COMMERCIAL

## 2024-02-07 ENCOUNTER — HOSPITAL ENCOUNTER (OUTPATIENT)
Dept: RADIATION ONCOLOGY | Age: 64
Discharge: HOME | End: 2024-02-07
Payer: COMMERCIAL

## 2024-02-08 ENCOUNTER — TELEPHONE (OUTPATIENT)
Dept: INFUSION THERAPY | Age: 64
End: 2024-02-08

## 2024-02-08 ENCOUNTER — HOSPITAL ENCOUNTER (OUTPATIENT)
Dept: RADIATION ONCOLOGY | Age: 64
Discharge: HOME | End: 2024-02-08
Payer: COMMERCIAL

## 2024-02-08 ENCOUNTER — APPOINTMENT (OUTPATIENT)
Dept: RADIATION ONCOLOGY | Age: 64
End: 2024-02-08
Payer: COMMERCIAL

## 2024-02-08 VITALS
WEIGHT: 170 LBS | RESPIRATION RATE: 16 BRPM | OXYGEN SATURATION: 97 % | HEART RATE: 79 BPM | TEMPERATURE: 98.2 F | BODY MASS INDEX: 29.18 KG/M2 | SYSTOLIC BLOOD PRESSURE: 128 MMHG | DIASTOLIC BLOOD PRESSURE: 82 MMHG

## 2024-02-08 DIAGNOSIS — M85.89 OSTEOPENIA OF MULTIPLE SITES: Primary | ICD-10-CM

## 2024-02-08 DIAGNOSIS — D05.11 NEOPLASM OF RIGHT BREAST, PRIMARY TUMOR STAGING CATEGORY TIS: DUCTAL CARCINOMA IN SITU (DCIS): ICD-10-CM

## 2024-02-08 RX ORDER — EPINEPHRINE 1 MG/ML
0.3 INJECTION, SOLUTION, CONCENTRATE INTRAVENOUS PRN
OUTPATIENT
Start: 2024-02-22

## 2024-02-08 RX ORDER — ONDANSETRON 2 MG/ML
8 INJECTION INTRAMUSCULAR; INTRAVENOUS
OUTPATIENT
Start: 2024-02-22

## 2024-02-08 RX ORDER — SODIUM CHLORIDE 9 MG/ML
5-250 INJECTION, SOLUTION INTRAVENOUS PRN
OUTPATIENT
Start: 2024-02-22

## 2024-02-08 RX ORDER — HEPARIN SODIUM (PORCINE) LOCK FLUSH IV SOLN 100 UNIT/ML 100 UNIT/ML
500 SOLUTION INTRAVENOUS PRN
OUTPATIENT
Start: 2024-02-22

## 2024-02-08 RX ORDER — DIPHENHYDRAMINE HYDROCHLORIDE 50 MG/ML
50 INJECTION INTRAMUSCULAR; INTRAVENOUS
OUTPATIENT
Start: 2024-02-22

## 2024-02-08 RX ORDER — FAMOTIDINE 10 MG/ML
20 INJECTION, SOLUTION INTRAVENOUS
OUTPATIENT
Start: 2024-02-22

## 2024-02-08 RX ORDER — ZOLEDRONIC ACID 5 MG/100ML
5 INJECTION, SOLUTION INTRAVENOUS ONCE
OUTPATIENT
Start: 2024-02-22 | End: 2024-02-22

## 2024-02-08 RX ORDER — ALBUTEROL SULFATE 90 UG/1
4 AEROSOL, METERED RESPIRATORY (INHALATION) PRN
OUTPATIENT
Start: 2024-02-22

## 2024-02-08 RX ORDER — ACETAMINOPHEN 325 MG/1
650 TABLET ORAL
OUTPATIENT
Start: 2024-02-22

## 2024-02-08 RX ORDER — SODIUM CHLORIDE 9 MG/ML
INJECTION, SOLUTION INTRAVENOUS CONTINUOUS
OUTPATIENT
Start: 2024-02-22

## 2024-02-08 RX ORDER — SODIUM CHLORIDE 0.9 % (FLUSH) 0.9 %
5-40 SYRINGE (ML) INJECTION PRN
OUTPATIENT
Start: 2024-02-22

## 2024-02-08 NOTE — PROGRESS NOTES
[facility]          [address], [address]        [place] [id number]        O: [phone]        F: [phone]       [employer]            Dr. Raghavendra Fernandez MD MS          Dr. Clemencia Ahumada MD PhD    ON TREATMENT VISIT (OTV) NOTE     Date of Service: 2024  Patient ID: Adriana Constantino   : 1960  MRN: [id number]   Acct Number: [id number]     RADIATION ONCOLOGY ATTENDING:  Clemencia Ahumada PhD MD    DIAGNOSIS:   Cancer Staging   Ductal carcinoma in situ (DCIS) of right breast  Staging form: Breast, AJCC 8th Edition  - Clinical stage from 2023: Stage 0 (cTis (DCIS), cN0, cM0, G1, ER+, ID+, HER2: Not Assessed) - Signed by Raghavendra Rosas MD on 2023  - Pathologic stage from 2023: Stage 0 (pTis (DCIS), cN0, cM0, G1, ER+, ID+, HER2: Not Assessed) - Signed by Raghavendra Rosas MD on 2023      Treatment Area: Breast     Current Total Dose(cGy): 3204  Current Fraction: 12/15  Final/Cumulative Rx. Dose (cGy): 4005    Patient was seen today for weekly visit.     Wt Readings from Last 3 Encounters:   24 77.1 kg (170 lb)   24 76.7 kg (169 lb)   24 76.3 kg (168 lb 3.4 oz)       /82   Pulse 79   Temp 98.2 °F (36.8 °C) (Infrared)   Resp 16   Wt 77.1 kg (170 lb)   SpO2 97%   BMI 29.18 kg/m²     Lab Results   Component Value Date    WBC 7.1 2023    HGB 14.0 2023    HCT 42.9 2023     2023       Comfort Alteration  Fatigue:Must curtail daily activities even with rest periods and early bedtime    Pain Location: Denies  Pain Intensity (Current): 0 No Pain  Pain Treatment: No treatment scheduled  Pain Relief: n/a  Hot Flashes/Flushes: None    Emotional Alteration:   Coping: effective    Nutritional Alteration  Anorexia: none   Nausea: 1-2 episodes of nausea in 24 hours  Vomiting: No vomiting   Dyspepsia/Heartburn: None    Skin Alteration   Skin reaction:

## 2024-02-08 NOTE — TELEPHONE ENCOUNTER
Called patient to inform of Zometa being approved but will need dental clearance before scheduled.

## 2024-02-09 ENCOUNTER — APPOINTMENT (OUTPATIENT)
Dept: RADIATION ONCOLOGY | Age: 64
End: 2024-02-09
Payer: COMMERCIAL

## 2024-02-12 ENCOUNTER — TELEPHONE (OUTPATIENT)
Dept: ONCOLOGY | Age: 64
End: 2024-02-12

## 2024-02-12 ENCOUNTER — HOSPITAL ENCOUNTER (OUTPATIENT)
Dept: RADIATION ONCOLOGY | Age: 64
Discharge: HOME | End: 2024-02-12
Payer: COMMERCIAL

## 2024-02-12 PROCEDURE — G6002 STEREOSCOPIC X-RAY GUIDANCE: HCPCS | Performed by: RADIOLOGY

## 2024-02-12 PROCEDURE — 77412 RADIATION TX DELIVERY LVL 3: CPT | Performed by: RADIOLOGY

## 2024-02-12 PROCEDURE — 77387 GUIDANCE FOR RADJ TX DLVR: CPT | Performed by: RADIOLOGY

## 2024-02-12 NOTE — TELEPHONE ENCOUNTER
Patient is returning Vega's call from Friday and is wondering if she can touch base with her about other bone strengthening medications to try besides the reclast since she is not sure she is wanting to go that route.

## 2024-02-13 ENCOUNTER — HOSPITAL ENCOUNTER (OUTPATIENT)
Dept: RADIATION ONCOLOGY | Age: 64
Discharge: HOME | End: 2024-02-13
Payer: COMMERCIAL

## 2024-02-13 PROCEDURE — 77412 RADIATION TX DELIVERY LVL 3: CPT | Performed by: RADIOLOGY

## 2024-02-13 PROCEDURE — 77387 GUIDANCE FOR RADJ TX DLVR: CPT | Performed by: RADIOLOGY

## 2024-02-13 PROCEDURE — G6002 STEREOSCOPIC X-RAY GUIDANCE: HCPCS | Performed by: RADIOLOGY

## 2024-02-14 ENCOUNTER — HOSPITAL ENCOUNTER (OUTPATIENT)
Dept: RADIATION ONCOLOGY | Age: 64
Discharge: HOME | End: 2024-02-14
Payer: COMMERCIAL

## 2024-02-14 PROCEDURE — 77387 GUIDANCE FOR RADJ TX DLVR: CPT | Performed by: RADIOLOGY

## 2024-02-14 PROCEDURE — 77412 RADIATION TX DELIVERY LVL 3: CPT | Performed by: RADIOLOGY

## 2024-02-15 ENCOUNTER — HOSPITAL ENCOUNTER (OUTPATIENT)
Dept: RADIATION ONCOLOGY | Age: 64
Discharge: HOME | End: 2024-02-15
Payer: COMMERCIAL

## 2024-02-15 VITALS
OXYGEN SATURATION: 99 % | WEIGHT: 169.31 LBS | BODY MASS INDEX: 29.06 KG/M2 | RESPIRATION RATE: 18 BRPM | HEART RATE: 80 BPM | DIASTOLIC BLOOD PRESSURE: 68 MMHG | TEMPERATURE: 97.8 F | SYSTOLIC BLOOD PRESSURE: 120 MMHG

## 2024-02-15 PROCEDURE — 77387 GUIDANCE FOR RADJ TX DLVR: CPT | Performed by: RADIOLOGY

## 2024-02-15 PROCEDURE — 77412 RADIATION TX DELIVERY LVL 3: CPT | Performed by: RADIOLOGY

## 2024-02-15 RX ORDER — ERGOCALCIFEROL 1.25 MG/1
CAPSULE ORAL
Qty: 4 CAPSULE | Refills: 0 | Status: SHIPPED | OUTPATIENT
Start: 2024-02-15

## 2024-02-15 NOTE — PROGRESS NOTES
additional instructions given    New medications, diagnostic results: Remain on RT break until Monday, MD to check skin before resuming RT    PLAN: Again reviewed potential side effects of radiation for the patient's treatment.    Continue local/topical care. RT break tomorrow, recheck Monday   Continue current radiation course as prescribed.

## 2024-02-22 ENCOUNTER — TELEPHONE (OUTPATIENT)
Dept: SPIRITUAL SERVICES | Facility: CLINIC | Age: 64
End: 2024-02-22

## 2024-03-04 ENCOUNTER — TELEPHONE (OUTPATIENT)
Dept: SPIRITUAL SERVICES | Facility: CLINIC | Age: 64
End: 2024-03-04

## 2024-03-05 ENCOUNTER — OFFICE VISIT (OUTPATIENT)
Dept: FAMILY MEDICINE CLINIC | Age: 64
End: 2024-03-05
Payer: COMMERCIAL

## 2024-03-05 VITALS
BODY MASS INDEX: 29.3 KG/M2 | OXYGEN SATURATION: 97 % | HEART RATE: 90 BPM | TEMPERATURE: 98.1 F | DIASTOLIC BLOOD PRESSURE: 78 MMHG | HEIGHT: 64 IN | WEIGHT: 171.6 LBS | RESPIRATION RATE: 16 BRPM | SYSTOLIC BLOOD PRESSURE: 122 MMHG

## 2024-03-05 DIAGNOSIS — M85.80 OSTEOPENIA, UNSPECIFIED LOCATION: ICD-10-CM

## 2024-03-05 DIAGNOSIS — K21.9 GASTROESOPHAGEAL REFLUX DISEASE WITHOUT ESOPHAGITIS: ICD-10-CM

## 2024-03-05 DIAGNOSIS — D05.11 NEOPLASM OF RIGHT BREAST, PRIMARY TUMOR STAGING CATEGORY TIS: DUCTAL CARCINOMA IN SITU (DCIS): Primary | ICD-10-CM

## 2024-03-05 DIAGNOSIS — F41.9 ANXIETY: ICD-10-CM

## 2024-03-05 PROCEDURE — 99214 OFFICE O/P EST MOD 30 MIN: CPT | Performed by: NURSE PRACTITIONER

## 2024-03-05 RX ORDER — CHOLECALCIFEROL (VITAMIN D3) 125 MCG
1 CAPSULE ORAL DAILY
Qty: 90 TABLET | Refills: 4 | Status: SHIPPED | OUTPATIENT
Start: 2024-03-05

## 2024-03-05 RX ORDER — ALENDRONATE SODIUM 70 MG/1
70 TABLET ORAL
Qty: 12 TABLET | Refills: 4 | Status: SHIPPED | OUTPATIENT
Start: 2024-03-05

## 2024-03-05 ASSESSMENT — PATIENT HEALTH QUESTIONNAIRE - PHQ9
SUM OF ALL RESPONSES TO PHQ QUESTIONS 1-9: 0
1. LITTLE INTEREST OR PLEASURE IN DOING THINGS: 0
SUM OF ALL RESPONSES TO PHQ QUESTIONS 1-9: 0
SUM OF ALL RESPONSES TO PHQ9 QUESTIONS 1 & 2: 0
SUM OF ALL RESPONSES TO PHQ QUESTIONS 1-9: 0
SUM OF ALL RESPONSES TO PHQ QUESTIONS 1-9: 0
2. FEELING DOWN, DEPRESSED OR HOPELESS: 0

## 2024-03-05 ASSESSMENT — ENCOUNTER SYMPTOMS: RESPIRATORY NEGATIVE: 1

## 2024-03-05 NOTE — PROGRESS NOTES
Heart sounds: Normal heart sounds. No murmur heard.  Skin:     General: Skin is warm and dry.   Neurological:      Mental Status: She is alert.   Psychiatric:         Mood and Affect: Mood normal.         Behavior: Behavior normal.         Thought Content: Thought content normal.         Judgment: Judgment normal.          /Plan:          1. Neoplasm of right breast, primary tumor staging category Tis: ductal carcinoma in situ (DCIS)  -continue with oncology     2. Osteopenia, unspecified location  -daily MVI  Vitamin D3 2000 units a day  Daily physical activity placing stress on bones   - alendronate (FOSAMAX) 70 MG tablet; Take 1 tablet by mouth every 7 days  Dispense: 12 tablet; Refill: 4    3. Gastroesophageal reflux disease without esophagitis  -GERD diet  -improved with prilosec 20 mg daily     4. Anxiety  -improved with wellbutrin and buspar     Orders Placed This Encounter    alendronate (FOSAMAX) 70 MG tablet     Sig: Take 1 tablet by mouth every 7 days     Dispense:  12 tablet     Refill:  4    Cholecalciferol (VITAMIN D3) 50 MCG (2000 UT) TABS     Sig: Take 1 tablet by mouth daily     Dispense:  90 tablet     Refill:  4      No follow-ups on file.    Reccommended tobaccocessation options including pharmacologic methods, counseled great than 3 minutesduring this visit:  Yes[]  No  []       Patient given educational materials -see patient instructions.  Discussed use, benefit, and side effects of prescribedmedications.  All patient questions answered.  Pt voiced understanding. Reviewedhealth maintenance.  Instructed to continue current medications, diet and exercise.Patient agreed with treatment plan. Follow up as directed.       Electronicallysigned by MARY ALICE Friedman - CNP on 3/5/2024 at 4:51 PM

## 2024-03-07 ENCOUNTER — OFFICE VISIT (OUTPATIENT)
Dept: ONCOLOGY | Age: 64
End: 2024-03-07
Payer: COMMERCIAL

## 2024-03-07 ENCOUNTER — HOSPITAL ENCOUNTER (OUTPATIENT)
Dept: INFUSION THERAPY | Age: 64
Discharge: HOME | End: 2024-03-07
Payer: COMMERCIAL

## 2024-03-07 VITALS
DIASTOLIC BLOOD PRESSURE: 67 MMHG | OXYGEN SATURATION: 99 % | HEART RATE: 63 BPM | TEMPERATURE: 97.9 F | HEIGHT: 64 IN | RESPIRATION RATE: 16 BRPM | BODY MASS INDEX: 29.19 KG/M2 | WEIGHT: 171 LBS | SYSTOLIC BLOOD PRESSURE: 119 MMHG

## 2024-03-07 VITALS
TEMPERATURE: 97.9 F | RESPIRATION RATE: 16 BRPM | OXYGEN SATURATION: 99 % | DIASTOLIC BLOOD PRESSURE: 67 MMHG | SYSTOLIC BLOOD PRESSURE: 119 MMHG | HEART RATE: 63 BPM

## 2024-03-07 DIAGNOSIS — D05.11 NEOPLASM OF RIGHT BREAST, PRIMARY TUMOR STAGING CATEGORY TIS: DUCTAL CARCINOMA IN SITU (DCIS): ICD-10-CM

## 2024-03-07 DIAGNOSIS — D05.11 DUCTAL CARCINOMA IN SITU (DCIS) OF RIGHT BREAST: ICD-10-CM

## 2024-03-07 DIAGNOSIS — D05.11 DUCTAL CARCINOMA IN SITU (DCIS) OF RIGHT BREAST: Primary | ICD-10-CM

## 2024-03-07 DIAGNOSIS — M85.80 OSTEOPENIA: ICD-10-CM

## 2024-03-07 LAB
ABSOLUTE IMMATURE GRANULOCYTE: 0.01 THOU/MM3 (ref 0–0.07)
ALBUMIN SERPL BCG-MCNC: 4.4 G/DL (ref 3.5–5.1)
ALP SERPL-CCNC: 105 U/L (ref 38–126)
ALT SERPL W/O P-5'-P-CCNC: 51 U/L (ref 11–66)
AST SERPL-CCNC: 28 U/L (ref 5–40)
BASOPHILS ABSOLUTE: 0.1 THOU/MM3 (ref 0–0.1)
BASOPHILS NFR BLD AUTO: 1 % (ref 0–3)
BILIRUB CONJ SERPL-MCNC: < 0.2 MG/DL (ref 0–0.3)
BILIRUB SERPL-MCNC: 0.3 MG/DL (ref 0.3–1.2)
BUN BLDP-MCNC: 11 MG/DL (ref 8–26)
CHLORIDE BLD-SCNC: 110 MEQ/L (ref 98–109)
CREAT BLD-MCNC: 0.7 MG/DL (ref 0.5–1.2)
EOSINOPHIL NFR BLD AUTO: 7 % (ref 0–4)
EOSINOPHILS ABSOLUTE: 0.5 THOU/MM3 (ref 0–0.4)
ERYTHROCYTE [DISTWIDTH] IN BLOOD BY AUTOMATED COUNT: 13.2 % (ref 11.5–14.5)
GFR SERPL CREATININE-BSD FRML MDRD: > 60 ML/MIN/1.73M2
GLUCOSE BLD-MCNC: 87 MG/DL (ref 70–108)
HCT VFR BLD AUTO: 39 % (ref 37–47)
HGB BLD-MCNC: 13.1 GM/DL (ref 12–16)
IMMATURE GRANULOCYTES: 0 %
IONIZED CALCIUM, WHOLE BLOOD: 1.12 MMOL/L (ref 1.12–1.32)
LYMPHOCYTES ABSOLUTE: 1.8 THOU/MM3 (ref 1–4.8)
LYMPHOCYTES NFR BLD AUTO: 26 % (ref 15–47)
MCH RBC QN AUTO: 30.6 PG (ref 26–33)
MCHC RBC AUTO-ENTMCNC: 33.6 GM/DL (ref 32.2–35.5)
MCV RBC AUTO: 91 FL (ref 81–99)
MONOCYTES ABSOLUTE: 0.4 THOU/MM3 (ref 0.4–1.3)
MONOCYTES NFR BLD AUTO: 6 % (ref 0–12)
NEUTROPHILS NFR BLD AUTO: 61 % (ref 43–75)
PLATELET # BLD AUTO: 239 THOU/MM3 (ref 130–400)
PMV BLD AUTO: 9.2 FL (ref 9.4–12.4)
POTASSIUM BLD-SCNC: 3.8 MEQ/L (ref 3.5–4.9)
PROT SERPL-MCNC: 6.8 G/DL (ref 6.1–8)
RBC # BLD AUTO: 4.28 MILL/MM3 (ref 4.2–5.4)
SEGMENTED NEUTROPHILS ABSOLUTE COUNT: 4.3 THOU/MM3 (ref 1.8–7.7)
SODIUM BLD-SCNC: 142 MEQ/L (ref 138–146)
TOTAL CO2, WHOLE BLOOD: 23 MEQ/L (ref 23–33)
WBC # BLD AUTO: 7.1 THOU/MM3 (ref 4.8–10.8)

## 2024-03-07 PROCEDURE — 99211 OFF/OP EST MAY X REQ PHY/QHP: CPT

## 2024-03-07 PROCEDURE — 85025 COMPLETE CBC W/AUTO DIFF WBC: CPT

## 2024-03-07 PROCEDURE — 36415 COLL VENOUS BLD VENIPUNCTURE: CPT

## 2024-03-07 PROCEDURE — 80076 HEPATIC FUNCTION PANEL: CPT

## 2024-03-07 PROCEDURE — 99214 OFFICE O/P EST MOD 30 MIN: CPT | Performed by: INTERNAL MEDICINE

## 2024-03-07 PROCEDURE — 80047 BASIC METABLC PNL IONIZED CA: CPT

## 2024-03-07 RX ORDER — ANASTROZOLE 1 MG/1
1 TABLET ORAL DAILY
Qty: 90 TABLET | Refills: 5 | Status: SHIPPED | OUTPATIENT
Start: 2024-03-07

## 2024-03-07 NOTE — PROGRESS NOTES
Cancer Maternal Uncle         SOCIAL HISTORY      Recent Results (from the past 24 hour(s))   POC PANEL BMP W/IOCA    Collection Time: 03/07/24 12:59 PM   Result Value Ref Range    Sodium, Whole Blood 142 138 - 146 meq/l    Potassium, Whole Blood 3.8 3.5 - 4.9 meq/l    Chloride, Whole Blood 110 (H) 98 - 109 meq/l    TOTAL CO2, WHOLE BLOOD 23 23 - 33 meq/l    Glucose, Whole Blood 87 70 - 108 mg/dl    BUN, WHOLE BLOOD 11 8 - 26 mg/dl    CREATININE, WHOLE BLOOD 0.7 0.5 - 1.2 mg/dl    Ionized Calcium, WB 1.12 1.12 - 1.32 mmol/L   CBC with Auto Differential    Collection Time: 03/07/24 12:59 PM   Result Value Ref Range    WBC 7.1 4.8 - 10.8 thou/mm3    RBC 4.28 4.20 - 5.40 mill/mm3    Hemoglobin 13.1 12.0 - 16.0 gm/dl    Hematocrit 39.0 37.0 - 47.0 %    MCV 91 81 - 99 fL    MCH 30.6 26.0 - 33.0 pg    MCHC 33.6 32.2 - 35.5 gm/dl    RDW 13.2 11.5 - 14.5 %    Platelets 239 130 - 400 thou/mm3    MPV 9.2 (L) 9.4 - 12.4 fL    Seg Neutrophils 61 43 - 75 %    Lymphocytes 26 15 - 47 %    Monocytes 6 0 - 12 %    Eosinophils % 7 (H) 0 - 4 %    BASINOPHIL, AUTOMATED 1 0 - 3 %    Immature Granulocytes 0 %    Segs Absolute 4.3 1.8 - 7.7 thou/mm3    Lymphocytes Absolute 1.8 1.0 - 4.8 thou/mm3    Monocytes Absolute 0.4 0.4 - 1.3 thou/mm3    Eosinophils Absolute 0.5 (H) 0.0 - 0.4 thou/mm3    Basophils Absolute 0.1 0.0 - 0.1 thou/mm3    Absolute Immature Granulocyte 0.01 0.00 - 0.07 thou/mm3   Glomerular Filtration Rate, Estimated    Collection Time: 03/07/24 12:59 PM   Result Value Ref Range    Est, Glom Filt Rate >60 >60 ml/min/1.73m2      Social History     Socioeconomic History    Marital status:      Spouse name: Parish    Number of children: 2    Years of education: Not on file    Highest education level: Not on file   Occupational History    Not on file   Tobacco Use    Smoking status: Former     Current packs/day: 0.00     Average packs/day: 0.5 packs/day for 26.0 years (13.0 ttl pk-yrs)     Types: Cigarettes     Start date:

## 2024-03-18 ENCOUNTER — HOSPITAL ENCOUNTER (OUTPATIENT)
Dept: ULTRASOUND IMAGING | Age: 64
Discharge: HOME | End: 2024-03-18
Payer: COMMERCIAL

## 2024-03-18 DIAGNOSIS — E04.2 MULTIPLE THYROID NODULES: ICD-10-CM

## 2024-03-18 PROCEDURE — 76536 US EXAM OF HEAD AND NECK: CPT

## 2024-03-25 ENCOUNTER — TELEPHONE (OUTPATIENT)
Dept: ENT CLINIC | Age: 64
End: 2024-03-25

## 2024-03-25 DIAGNOSIS — E04.2 MULTIPLE THYROID NODULES: Primary | ICD-10-CM

## 2024-03-25 NOTE — TELEPHONE ENCOUNTER
The patient does not have a follow up scheduled regarding the US results. Please contact the patient and let her know that her thyroid ultrasound appears stable.  No changes to her nodules which is great news.  I would recommend repeat ultrasound in about 6 months and if this remains stable likely go to yearly.  I will also place orders for TSH to be completed in about 6 months as well to check her thyroid function.  Will plan for her to follow-up after the 6-month ultrasound to review results of the TSH and US. She can contact the office with further concerns in the meantime.

## 2024-04-01 NOTE — TELEPHONE ENCOUNTER
Patient called back in and I relayed the information to her.    Patient verbalized understanding and thanked me.

## 2024-04-18 DIAGNOSIS — F41.9 ANXIETY: ICD-10-CM

## 2024-04-18 DIAGNOSIS — R45.86 MOOD SWINGS: Primary | ICD-10-CM

## 2024-04-18 RX ORDER — BUPROPION HYDROCHLORIDE 150 MG/1
150 TABLET ORAL EVERY MORNING
Qty: 30 TABLET | Refills: 1 | Status: SHIPPED | OUTPATIENT
Start: 2024-04-18

## 2024-04-18 RX ORDER — LAMOTRIGINE 25 MG/1
25 TABLET, EXTENDED RELEASE ORAL DAILY
Qty: 30 TABLET | Refills: 2 | Status: SHIPPED | OUTPATIENT
Start: 2024-04-18

## 2024-04-24 DIAGNOSIS — F41.9 ANXIETY: Primary | ICD-10-CM

## 2024-04-24 RX ORDER — LORAZEPAM 0.5 MG/1
0.5 TABLET ORAL EVERY 8 HOURS PRN
Qty: 10 TABLET | Refills: 0 | Status: SHIPPED | OUTPATIENT
Start: 2024-04-24 | End: 2024-04-24 | Stop reason: SDUPTHER

## 2024-04-24 RX ORDER — LORAZEPAM 0.5 MG/1
0.5 TABLET ORAL EVERY 8 HOURS PRN
Qty: 10 TABLET | Refills: 0 | Status: SHIPPED | OUTPATIENT
Start: 2024-04-24 | End: 2024-05-24

## 2024-04-24 NOTE — TELEPHONE ENCOUNTER
Recent Visits  Date Type Provider Dept   03/05/24 Office Visit Ward Gamboa APRN - CNP [facility]   10/31/23 Office Visit Ward Gamboa APRN - CNP [facility]   10/03/23 Office Visit Ward Gamboa APRN - CNP [facility]   Showing recent visits within past 540 days with a meds authorizing provider and meeting all other requirements  Future Appointments  Date Type Provider Dept   04/30/24 Appointment Ward Gamboa APRN - CNP [facility]   Showing future appointments within next 150 days with a meds authorizing provider and meeting all other requirements

## 2024-04-30 ENCOUNTER — OFFICE VISIT (OUTPATIENT)
Dept: FAMILY MEDICINE CLINIC | Age: 64
End: 2024-04-30
Payer: COMMERCIAL

## 2024-04-30 VITALS
RESPIRATION RATE: 16 BRPM | WEIGHT: 167.2 LBS | HEART RATE: 109 BPM | HEIGHT: 64 IN | OXYGEN SATURATION: 97 % | TEMPERATURE: 98.2 F | SYSTOLIC BLOOD PRESSURE: 118 MMHG | BODY MASS INDEX: 28.54 KG/M2 | DIASTOLIC BLOOD PRESSURE: 72 MMHG

## 2024-04-30 DIAGNOSIS — F43.9 STRESS AT HOME: ICD-10-CM

## 2024-04-30 DIAGNOSIS — K21.9 GASTROESOPHAGEAL REFLUX DISEASE, UNSPECIFIED WHETHER ESOPHAGITIS PRESENT: ICD-10-CM

## 2024-04-30 DIAGNOSIS — F41.9 ANXIETY: ICD-10-CM

## 2024-04-30 DIAGNOSIS — F39 MOOD DISORDER (HCC): Primary | ICD-10-CM

## 2024-04-30 PROCEDURE — 99214 OFFICE O/P EST MOD 30 MIN: CPT | Performed by: NURSE PRACTITIONER

## 2024-04-30 RX ORDER — OMEPRAZOLE 20 MG/1
20 CAPSULE, DELAYED RELEASE ORAL 2 TIMES DAILY
Qty: 180 CAPSULE | Refills: 4 | Status: SHIPPED | OUTPATIENT
Start: 2024-04-30

## 2024-04-30 ASSESSMENT — ENCOUNTER SYMPTOMS: RESPIRATORY NEGATIVE: 1

## 2024-04-30 NOTE — PROGRESS NOTES
12/28/2032    Hepatitis A vaccine  Aged Out    Hepatitis B vaccine  Aged Out    Hib vaccine  Aged Out    Polio vaccine  Aged Out    Meningococcal (ACWY) vaccine  Aged Out    A1C test (Diabetic or Prediabetic)  Discontinued    GFR test (Diabetes, CKD 3-4, OR last GFR 15-59)  Discontinued    Diabetes screen  Discontinued     I have reviewed the patient's past medical history, past surgical history, allergies, medications, social and family history and I have made updates where appropriate.  Subjective:      Review of Systems   Constitutional:  Negative for chills, fatigue and fever.   Respiratory: Negative.     Cardiovascular: Negative.    Musculoskeletal: Negative.    Skin: Negative.    Neurological: Negative.    Psychiatric/Behavioral:  Positive for sleep disturbance. Negative for agitation, behavioral problems, confusion, decreased concentration, dysphoric mood, self-injury and suicidal ideas. The patient is nervous/anxious and is hyperactive.        Objective:     /72   Pulse (!) 109   Temp 98.2 °F (36.8 °C)   Resp 16   Ht 1.626 m (5' 4\")   Wt 75.8 kg (167 lb 3.2 oz)   SpO2 97%   BMI 28.70 kg/m²     Physical Exam  Vitals and nursing note reviewed.   Constitutional:       Appearance: She is not ill-appearing.   Cardiovascular:      Rate and Rhythm: Regular rhythm. Tachycardia present.      Pulses: Normal pulses.      Heart sounds: Normal heart sounds.   Pulmonary:      Effort: Pulmonary effort is normal.   Skin:     General: Skin is warm and dry.   Neurological:      General: No focal deficit present.      Mental Status: She is alert and oriented to person, place, and time.   Psychiatric:         Attention and Perception: Attention normal.         Mood and Affect: Mood is anxious.         Speech: Speech normal.         Behavior: Behavior normal. Behavior is cooperative.         Thought Content: Thought content does not include homicidal or suicidal plan.         Cognition and Memory: Cognition normal.

## 2024-04-30 NOTE — ASSESSMENT & PLAN NOTE
Borderline controlled, continue current medications lamictal is working weaning off wellbutrin may be making anxiety worse

## 2024-05-09 ENCOUNTER — TELEPHONE (OUTPATIENT)
Dept: FAMILY MEDICINE CLINIC | Age: 64
End: 2024-05-09

## 2024-05-29 ENCOUNTER — TELEPHONE (OUTPATIENT)
Dept: ENT CLINIC | Age: 64
End: 2024-05-29

## 2024-05-29 NOTE — TELEPHONE ENCOUNTER
I received a phone call from Adriana this morning.   She is frustrated due to having to have repeat FNA procedures done every 6 months.   She is wondering if something else can be done at this point to help with her thyroid nodules.   She is trying to get a life insurance policy and they are giving her a hard time about her condition.  Please advise.

## 2024-05-30 NOTE — TELEPHONE ENCOUNTER
Adriana called back and I explained what Ray had said.    She said she will reach out to the person that needs documentation and let us know if there is anything that we can provide that would help her.       Patient verbalized understanding and thanked me.

## 2024-06-13 ENCOUNTER — HOSPITAL ENCOUNTER (OUTPATIENT)
Dept: INFUSION THERAPY | Age: 64
Discharge: HOME | End: 2024-06-13
Payer: COMMERCIAL

## 2024-06-13 ENCOUNTER — OFFICE VISIT (OUTPATIENT)
Dept: ONCOLOGY | Age: 64
End: 2024-06-13
Payer: COMMERCIAL

## 2024-06-13 VITALS
OXYGEN SATURATION: 97 % | SYSTOLIC BLOOD PRESSURE: 121 MMHG | RESPIRATION RATE: 16 BRPM | TEMPERATURE: 98.3 F | DIASTOLIC BLOOD PRESSURE: 84 MMHG | HEART RATE: 84 BPM

## 2024-06-13 VITALS
SYSTOLIC BLOOD PRESSURE: 121 MMHG | TEMPERATURE: 98.3 F | HEART RATE: 84 BPM | WEIGHT: 163.4 LBS | DIASTOLIC BLOOD PRESSURE: 84 MMHG | BODY MASS INDEX: 28.05 KG/M2 | OXYGEN SATURATION: 97 %

## 2024-06-13 DIAGNOSIS — D05.11 DUCTAL CARCINOMA IN SITU (DCIS) OF RIGHT BREAST: Primary | ICD-10-CM

## 2024-06-13 PROCEDURE — 99214 OFFICE O/P EST MOD 30 MIN: CPT | Performed by: INTERNAL MEDICINE

## 2024-06-13 PROCEDURE — 99211 OFF/OP EST MAY X REQ PHY/QHP: CPT

## 2024-06-13 RX ORDER — ANASTROZOLE 1 MG/1
1 TABLET ORAL DAILY
Qty: 90 TABLET | Refills: 5 | Status: SHIPPED | OUTPATIENT
Start: 2024-06-13

## 2024-06-13 NOTE — PATIENT INSTRUCTIONS
Orders Placed This Encounter   Procedures    ALYCIA DIGITAL SCREEN W OR WO CAD BILATERAL   Please schedule the next visit as survivorship clinic with Ms. Jana.  Orders Placed This Encounter   Medications    anastrozole (ARIMIDEX) 1 MG tablet     Sig: Take 1 tablet by mouth daily     Dispense:  90 tablet     Refill:  5

## 2024-06-13 NOTE — PROGRESS NOTES
[facility] [place] [facility]  [facility]  [address]  [address]  [place] [id number]  Dept: [phone]  Loc: [phone]     Adriana Constantino  1960   No ref. provider found   Ward Gamboa, APRN - CNP           CHIEF COMPLAINT  Chief Complaint   Patient presents with    Follow-up     Ductal carcinoma in situ (DCIS) of right breast          HISTORY OF PRESENT ILLNESS  Ms. Adriana Constantino is a pleasant  63 y.o lady who is here for initial consultation for ductal carcinoma in situ of the right breast status post lumpectomy and re-excision of the surgical pramod.Core biopsy ( 23) with findings of complex intracystic papillary lesion with ductal carcinoma in situ, estrogen receptor positive, progesterone receptor positive.Right lumpectomy was performed ( 23)  path with findings of  Multifocal low-grade ductal carcinoma in situ, with margin less than 1mm,re- resection was performed on 12/15/2023 with pathology will findings of negative for carcinoma or in -situ findings, rare atypical ductal proliferation.  She denies breast changes, breast lumps, nipple changes ,nipple discharge. Post operative recovery un eventful.    Pertinent medical history:  Arthritis  Cerebral artery occlusion with cerebral infarction  Gastroesophageal reflux disease  Subarachnoid hemorrhage      Pertinent surgical history:  Blepharoplasty   colonoscopy    Family history :  Breast cancer in her maternal grandmother at the age of 65  Lung cancer in her father, maternal uncle (3 of them)  Lung cancer in her sister (age 65)  Uterine cancer in her maternal grandmother      Gynecological history:  Age of menarche: 13 years  Menopause: Early 50s  Hormone replacement therapy: None  Oral contraceptive pills: 9 years  Children  2 para 2  Age 40 years  Age 38 years    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

## 2024-06-14 DIAGNOSIS — F41.9 ANXIETY: ICD-10-CM

## 2024-06-14 DIAGNOSIS — R45.86 MOOD SWINGS: ICD-10-CM

## 2024-06-14 RX ORDER — LAMOTRIGINE 50 MG/1
50 TABLET, EXTENDED RELEASE ORAL DAILY
Qty: 90 TABLET | Refills: 1 | Status: SHIPPED | OUTPATIENT
Start: 2024-06-14

## 2024-07-30 ENCOUNTER — OFFICE VISIT (OUTPATIENT)
Dept: FAMILY MEDICINE CLINIC | Age: 64
End: 2024-07-30
Payer: COMMERCIAL

## 2024-07-30 VITALS
OXYGEN SATURATION: 96 % | TEMPERATURE: 98.1 F | BODY MASS INDEX: 28.68 KG/M2 | RESPIRATION RATE: 12 BRPM | WEIGHT: 168 LBS | DIASTOLIC BLOOD PRESSURE: 80 MMHG | HEART RATE: 78 BPM | SYSTOLIC BLOOD PRESSURE: 124 MMHG | HEIGHT: 64 IN

## 2024-07-30 DIAGNOSIS — M85.80 OSTEOPENIA, UNSPECIFIED LOCATION: ICD-10-CM

## 2024-07-30 DIAGNOSIS — F39 MOOD DISORDER (HCC): Primary | ICD-10-CM

## 2024-07-30 DIAGNOSIS — L20.82 FLEXURAL ECZEMA: ICD-10-CM

## 2024-07-30 DIAGNOSIS — K21.9 GASTROESOPHAGEAL REFLUX DISEASE WITHOUT ESOPHAGITIS: ICD-10-CM

## 2024-07-30 DIAGNOSIS — Z11.59 ENCOUNTER FOR HEPATITIS C SCREENING TEST FOR LOW RISK PATIENT: ICD-10-CM

## 2024-07-30 DIAGNOSIS — Z11.4 SCREENING FOR HIV (HUMAN IMMUNODEFICIENCY VIRUS): ICD-10-CM

## 2024-07-30 DIAGNOSIS — Z86.73 HISTORY OF STROKE: ICD-10-CM

## 2024-07-30 PROCEDURE — 99214 OFFICE O/P EST MOD 30 MIN: CPT | Performed by: NURSE PRACTITIONER

## 2024-07-30 RX ORDER — TRIAMCINOLONE ACETONIDE 1 MG/G
OINTMENT TOPICAL 2 TIMES DAILY
Qty: 80 G | Refills: 1 | Status: SHIPPED | OUTPATIENT
Start: 2024-07-30 | End: 2024-08-06

## 2024-07-30 ASSESSMENT — ENCOUNTER SYMPTOMS
GASTROINTESTINAL NEGATIVE: 1
COLOR CHANGE: 1

## 2024-07-30 NOTE — PROGRESS NOTES
up    GERD   controlled with prilosec bid, sees GI  Has had an EGD in past and colonoscopy will get reports.     Recently diagnosed with DCIS  Following with Oncology had a lumpectomy of right breast.     Osteopenia  Taking fosamax weekly  Taking daily MVI and extra Vitamin D3 200 units day     Last PAP 10/2023  Last mammogram 11/2023  Last colon 6/2020 10 year recall  Last DEXA scan 12/2023    Lab Results   Component Value Date    WBC 7.1 03/07/2024    HGB 13.1 03/07/2024    HCT 39.0 03/07/2024    MCV 91 03/07/2024     03/07/2024      Lab Results   Component Value Date     03/07/2024    K 3.8 03/07/2024     11/30/2023    CO2 27 11/30/2023    BUN 13 11/30/2023    CREATININE 0.7 03/07/2024    GLUCOSE 84 11/30/2023    CALCIUM 9.3 11/30/2023    BILITOT 0.3 03/07/2024    ALKPHOS 105 03/07/2024    AST 28 03/07/2024    ALT 51 03/07/2024    LABGLOM >60 03/07/2024    GFRAA >60 11/05/2016        Lab Results   Component Value Date    CHOL 124 11/30/2023    TRIG 70 11/30/2023    HDL 69 11/30/2023    LDL 41 11/30/2023    VLDL NOT REPORTED 10/26/2016    CHOLHDLRATIO 2.8 10/26/2016      Lab Results   Component Value Date    TSH 1.640 09/26/2023       Wt Readings from Last 3 Encounters:   07/30/24 76.2 kg (168 lb)   06/13/24 74.1 kg (163 lb 6.4 oz)   04/30/24 75.8 kg (167 lb 3.2 oz)        Current Outpatient Medications   Medication Sig Dispense Refill    triamcinolone (KENALOG) 0.1 % ointment Apply topically 2 times daily for 7 days 80 g 1    lamoTRIgine (LAMICTAL XR) 50 MG extended release tablet Take 1 tablet by mouth daily 90 tablet 1    anastrozole (ARIMIDEX) 1 MG tablet Take 1 tablet by mouth daily 90 tablet 5    omeprazole (PRILOSEC) 20 MG delayed release capsule Take 1 capsule by mouth 2 times daily 180 capsule 4    metFORMIN (GLUCOPHAGE) 1000 MG tablet TAKE ONE TABLET BY MOUTH TWICE A DAY WITH MEALS. 180 tablet 1    alendronate (FOSAMAX) 70 MG tablet Take 1 tablet by mouth every 7 days 12 tablet 4

## 2024-07-31 ENCOUNTER — LAB (OUTPATIENT)
Dept: LAB | Age: 64
End: 2024-07-31

## 2024-07-31 DIAGNOSIS — Z11.4 SCREENING FOR HIV (HUMAN IMMUNODEFICIENCY VIRUS): ICD-10-CM

## 2024-07-31 LAB — HCV IGG SERPL QL IA: NEGATIVE

## 2024-08-01 ENCOUNTER — TELEPHONE (OUTPATIENT)
Dept: FAMILY MEDICINE CLINIC | Age: 64
End: 2024-08-01

## 2024-08-01 LAB — HIV 1+2 AB+HIV1 P24 AG SERPL QL IA: NORMAL

## 2024-08-01 NOTE — TELEPHONE ENCOUNTER
----- Message from MARY ALICE Friedman - CNP sent at 8/1/2024  1:42 PM EDT -----  Let pt know her hiv and hep c are negative

## 2024-08-16 DIAGNOSIS — F43.21 GRIEF: ICD-10-CM

## 2024-08-16 DIAGNOSIS — F41.9 ANXIETY: Primary | ICD-10-CM

## 2024-08-16 RX ORDER — CLONAZEPAM 0.5 MG/1
0.5 TABLET ORAL 2 TIMES DAILY PRN
Qty: 20 TABLET | Refills: 0 | Status: SHIPPED | OUTPATIENT
Start: 2024-08-16 | End: 2024-08-26

## 2024-08-26 ENCOUNTER — HOSPITAL ENCOUNTER (OUTPATIENT)
Dept: RADIATION ONCOLOGY | Age: 64
Discharge: HOME | End: 2024-08-26
Payer: COMMERCIAL

## 2024-08-26 VITALS
DIASTOLIC BLOOD PRESSURE: 77 MMHG | TEMPERATURE: 98.3 F | RESPIRATION RATE: 16 BRPM | BODY MASS INDEX: 29.52 KG/M2 | HEART RATE: 91 BPM | SYSTOLIC BLOOD PRESSURE: 140 MMHG | OXYGEN SATURATION: 94 % | WEIGHT: 172 LBS

## 2024-08-26 PROBLEM — D05.11 INTRADUCTAL CARCINOMA IN SITU OF RIGHT BREAST: Status: ACTIVE | Noted: 2024-08-26

## 2024-08-26 PROCEDURE — 99212 OFFICE O/P EST SF 10 MIN: CPT | Performed by: PHYSICIAN ASSISTANT

## 2024-08-26 PROCEDURE — 99213 OFFICE O/P EST LOW 20 MIN: CPT | Performed by: PHYSICIAN ASSISTANT

## 2024-08-26 ASSESSMENT — ENCOUNTER SYMPTOMS
SHORTNESS OF BREATH: 0
NAUSEA: 0
RECTAL PAIN: 0
BACK PAIN: 0
COUGH: 1
BLOOD IN STOOL: 0
DIARRHEA: 0
TROUBLE SWALLOWING: 0
ABDOMINAL PAIN: 0

## 2024-08-26 NOTE — PROGRESS NOTES
[facility]  [address] [place] [id number]  Phone: [phone]   Toll Free: [phone]   Fax: [phone]    RADIATION ONCOLOGY FOLLOW UP REPORT    PATIENT NAME:  Adriana Constantino              : 1960  MEDICAL RECORD NO: [id number]    LOCATION: Radiation Oncology  CSN NO: [id number]      PROVIDER: Ray Macario PA-C    DATE OF SERVICE: 2024      FOLLOW UP PHYSICIANS: eGra Ferrari;  WILIAM Gamboa APRN-CNP       DIAGNOSIS: D05.11 -- Intraductal carcinoma in situ of right female breast, Grade 1; pTis N0 M0, Stage 0; ER+(100%, strong); CA+(100%, strong) [multifocal]  Date of diagnosis: 2023      ASSESSMENT:  Adriana Constantino is doing well in her recovery from radiation therapy. she reports no residual radiation side effects at this time. She did have skin breakdown shortly after XRT completed, but this has since resolved. She denies any other radiation concerns at this time. There is no evidence of unexpected radiation complications or persistent/recurrent disease on exam.         PLAN:  Surveillance recommendations were reviewed. Advised repeat mammogram per routine mammogram in November at this point since she is 6+ months post-XRT. No concerning findings on exam today.   Radiation Oncology follow up in 6 months for continued surveillance/results review. As usual, Adriana Constantino was reminded to call and arrange for an earlier appointment if she has any problems, questions, or concerns in the meantime.   Continue Arimidex per medical oncology  Continue care in medical oncology and with other physicians/providers.   Continue surveillance and basic/preventative/supportive health care in accordance with clinical practice guidelines.        RADIATION THERAPY TREATMENT HISTORY: Treatment Course Number: 1     Treatment Site Modality Dose (cGy) From To Fractions/  Elapsed Days   Right Breast Mixed 6MV/15MV photons 4005 cGy 2024

## 2024-10-11 DIAGNOSIS — F41.9 ANXIETY: ICD-10-CM

## 2024-10-11 DIAGNOSIS — F43.21 GRIEF: ICD-10-CM

## 2024-10-11 RX ORDER — LORAZEPAM 0.5 MG/1
TABLET ORAL
Qty: 10 TABLET | Refills: 0 | OUTPATIENT
Start: 2024-10-11 | End: 2024-11-10

## 2024-10-11 RX ORDER — CLONAZEPAM 0.5 MG/1
TABLET ORAL
Qty: 20 TABLET | Refills: 0 | Status: SHIPPED | OUTPATIENT
Start: 2024-10-11 | End: 2024-10-21

## 2024-10-11 NOTE — TELEPHONE ENCOUNTER
Recent Visits  Date Type Provider Dept   07/30/24 Office Visit Gamboa, Jan, APRN - CNP [facility]   04/30/24 Office Visit Gamboa, Jan, APRN - CNP [facility]   03/05/24 Office Visit Gamboa, Jan, APRN - CNP [facility]   10/31/23 Office Visit BeeWard, APRN - CNP [facility]   10/03/23 Office Visit Gamboa, Jan, APRN - CNP [facility]   Showing recent visits within past 540 days with a meds authorizing provider and meeting all other requirements  Future Appointments  No visits were found meeting these conditions.  Showing future appointments within next 150 days with a meds authorizing provider and meeting all other requirements

## 2024-10-11 NOTE — TELEPHONE ENCOUNTER
Controlled Substance Monitoring:    Acute and Chronic Pain Monitoring:   RX Monitoring Periodic Controlled Substance Monitoring   10/11/2024  11:35 AM No signs of potential drug abuse or diversion identified.

## 2024-10-29 ENCOUNTER — HOSPITAL ENCOUNTER (OUTPATIENT)
Dept: ULTRASOUND IMAGING | Age: 64
Discharge: HOME | End: 2024-10-29
Payer: COMMERCIAL

## 2024-10-29 DIAGNOSIS — E04.2 MULTIPLE THYROID NODULES: ICD-10-CM

## 2024-10-29 PROCEDURE — 76536 US EXAM OF HEAD AND NECK: CPT

## 2024-10-31 ENCOUNTER — TELEPHONE (OUTPATIENT)
Dept: RADIATION ONCOLOGY | Age: 64
End: 2024-10-31

## 2024-10-31 DIAGNOSIS — E04.2 MULTIPLE THYROID NODULES: Primary | ICD-10-CM

## 2024-10-31 NOTE — TELEPHONE ENCOUNTER
I contacted Adriana to discuss ultrasound results. Overall, relatively stable with subtle growth in one nodule and newly reported lesion in isthmus that is relatively small and TR3. Advised continuing with yearly US for surveillance. I will continue to follow for now since I am assistance with breast surveillance as well. If there is growth in future, will likely order FNA and refer back to ENT. She was agreeable with this. Order for repeat thyroid US in 1 year placed. Please help schedule this. Ok to keep follow up in March with me as scheduled.

## 2024-11-06 DIAGNOSIS — F43.21 GRIEF: ICD-10-CM

## 2024-11-06 DIAGNOSIS — F41.9 ANXIETY: ICD-10-CM

## 2024-11-06 RX ORDER — CLONAZEPAM 0.5 MG/1
0.5 TABLET ORAL 2 TIMES DAILY PRN
Qty: 20 TABLET | Refills: 0 | Status: SHIPPED | OUTPATIENT
Start: 2024-11-06 | End: 2024-11-16

## 2024-11-06 NOTE — TELEPHONE ENCOUNTER
I reviewed an OARRS report on patient today.  There were no abnormal findings on the report.  Rx sent  Electronically signed by MARY ALICE Carbajal CNP on 11/6/24 at 3:15 PM EST

## 2024-11-26 ENCOUNTER — HOSPITAL ENCOUNTER (OUTPATIENT)
Dept: WOMENS IMAGING | Age: 64
Discharge: HOME | End: 2024-11-26
Payer: COMMERCIAL

## 2024-11-26 VITALS — WEIGHT: 165 LBS | HEIGHT: 64 IN | BODY MASS INDEX: 28.17 KG/M2

## 2024-11-26 DIAGNOSIS — D05.11 DUCTAL CARCINOMA IN SITU (DCIS) OF RIGHT BREAST: ICD-10-CM

## 2024-11-26 PROCEDURE — 77063 BREAST TOMOSYNTHESIS BI: CPT

## 2024-12-20 DIAGNOSIS — F43.21 GRIEF: ICD-10-CM

## 2024-12-20 DIAGNOSIS — F41.9 ANXIETY: ICD-10-CM

## 2024-12-20 RX ORDER — CLONAZEPAM 0.5 MG/1
TABLET ORAL
Qty: 20 TABLET | Refills: 0 | Status: SHIPPED | OUTPATIENT
Start: 2024-12-20 | End: 2024-12-30

## 2025-01-06 NOTE — TELEPHONE ENCOUNTER
For this encounter, please see note attached to flowsheet.     [facility]     Spiritual Care Department  [address] [place] [address] [phone]

## 2025-01-06 NOTE — TELEPHONE ENCOUNTER
For this encounter, please see note attached to flowsheet.     [facility]     Spiritual Care Department  [id number] [place] [address] [phone]

## 2025-01-06 NOTE — TELEPHONE ENCOUNTER
For this encounter, please see note attached to flowsheet.     [facility]     Spiritual Care Department  [address] [place] [address] [phone]

## 2025-01-06 NOTE — TELEPHONE ENCOUNTER
For this encounter, please see note attached to flowsheet.     [facility]     Spiritual Care Department  [address] [place] [address] [phone]

## 2025-01-08 DIAGNOSIS — F41.9 ANXIETY: ICD-10-CM

## 2025-01-08 DIAGNOSIS — F43.21 GRIEF: ICD-10-CM

## 2025-01-08 RX ORDER — CLONAZEPAM 0.5 MG/1
TABLET ORAL
Qty: 20 TABLET | Refills: 0 | Status: SHIPPED | OUTPATIENT
Start: 2025-01-08 | End: 2025-02-07

## 2025-01-31 DIAGNOSIS — F41.9 ANXIETY: ICD-10-CM

## 2025-01-31 DIAGNOSIS — F43.21 GRIEF: ICD-10-CM

## 2025-01-31 RX ORDER — CLONAZEPAM 0.5 MG/1
TABLET ORAL
Qty: 20 TABLET | Refills: 0 | OUTPATIENT
Start: 2025-01-31

## 2025-01-31 NOTE — TELEPHONE ENCOUNTER
Pt asking for refills on klonopin, has not been seen in 6 months and will need an office visit prior to more refills thanks

## 2025-02-06 ENCOUNTER — OFFICE VISIT (OUTPATIENT)
Dept: FAMILY MEDICINE CLINIC | Age: 65
End: 2025-02-06

## 2025-02-06 VITALS
BODY MASS INDEX: 28.85 KG/M2 | RESPIRATION RATE: 16 BRPM | OXYGEN SATURATION: 99 % | HEIGHT: 64 IN | TEMPERATURE: 98.2 F | SYSTOLIC BLOOD PRESSURE: 134 MMHG | WEIGHT: 169 LBS | DIASTOLIC BLOOD PRESSURE: 78 MMHG | HEART RATE: 85 BPM

## 2025-02-06 DIAGNOSIS — K21.9 GASTROESOPHAGEAL REFLUX DISEASE WITHOUT ESOPHAGITIS: ICD-10-CM

## 2025-02-06 DIAGNOSIS — D05.11 INTRADUCTAL CARCINOMA IN SITU OF RIGHT BREAST: ICD-10-CM

## 2025-02-06 DIAGNOSIS — F51.04 PSYCHOPHYSIOLOGICAL INSOMNIA: ICD-10-CM

## 2025-02-06 DIAGNOSIS — E78.5 DYSLIPIDEMIA: ICD-10-CM

## 2025-02-06 DIAGNOSIS — M85.80 OSTEOPENIA, UNSPECIFIED LOCATION: ICD-10-CM

## 2025-02-06 DIAGNOSIS — Z86.73 HISTORY OF STROKE: ICD-10-CM

## 2025-02-06 DIAGNOSIS — Z51.81 MEDICATION MONITORING ENCOUNTER: ICD-10-CM

## 2025-02-06 DIAGNOSIS — K21.9 GASTROESOPHAGEAL REFLUX DISEASE, UNSPECIFIED WHETHER ESOPHAGITIS PRESENT: ICD-10-CM

## 2025-02-06 DIAGNOSIS — R45.86 MOOD SWINGS: ICD-10-CM

## 2025-02-06 DIAGNOSIS — Z23 NEEDS FLU SHOT: ICD-10-CM

## 2025-02-06 DIAGNOSIS — F41.9 ANXIETY: Primary | ICD-10-CM

## 2025-02-06 DIAGNOSIS — R45.4 IRRITABILITY: ICD-10-CM

## 2025-02-06 PROBLEM — F39 MOOD DISORDER (HCC): Status: RESOLVED | Noted: 2024-04-30 | Resolved: 2025-02-06

## 2025-02-06 LAB — COMPREHENSIVE DRUG PROMPT: NORMAL

## 2025-02-06 RX ORDER — TRAZODONE HYDROCHLORIDE 50 MG/1
50 TABLET, FILM COATED ORAL NIGHTLY PRN
Qty: 30 TABLET | Refills: 5 | Status: SHIPPED | OUTPATIENT
Start: 2025-02-06

## 2025-02-06 RX ORDER — LAMOTRIGINE 25 MG/1
50 TABLET ORAL DAILY
Qty: 180 TABLET | Refills: 3 | Status: SHIPPED | OUTPATIENT
Start: 2025-02-06

## 2025-02-06 RX ORDER — ALENDRONATE SODIUM 70 MG/1
70 TABLET ORAL
Qty: 12 TABLET | Refills: 4 | Status: SHIPPED | OUTPATIENT
Start: 2025-02-06

## 2025-02-06 RX ORDER — ATORVASTATIN CALCIUM 20 MG/1
20 TABLET, FILM COATED ORAL DAILY
Qty: 90 TABLET | Refills: 4 | Status: SHIPPED | OUTPATIENT
Start: 2025-02-06

## 2025-02-06 SDOH — ECONOMIC STABILITY: FOOD INSECURITY: WITHIN THE PAST 12 MONTHS, THE FOOD YOU BOUGHT JUST DIDN'T LAST AND YOU DIDN'T HAVE MONEY TO GET MORE.: NEVER TRUE

## 2025-02-06 SDOH — ECONOMIC STABILITY: FOOD INSECURITY: WITHIN THE PAST 12 MONTHS, YOU WORRIED THAT YOUR FOOD WOULD RUN OUT BEFORE YOU GOT MONEY TO BUY MORE.: NEVER TRUE

## 2025-02-06 ASSESSMENT — PATIENT HEALTH QUESTIONNAIRE - PHQ9
SUM OF ALL RESPONSES TO PHQ9 QUESTIONS 1 & 2: 2
SUM OF ALL RESPONSES TO PHQ QUESTIONS 1-9: 2
2. FEELING DOWN, DEPRESSED OR HOPELESS: SEVERAL DAYS
SUM OF ALL RESPONSES TO PHQ QUESTIONS 1-9: 2
1. LITTLE INTEREST OR PLEASURE IN DOING THINGS: SEVERAL DAYS
SUM OF ALL RESPONSES TO PHQ QUESTIONS 1-9: 2
SUM OF ALL RESPONSES TO PHQ QUESTIONS 1-9: 2

## 2025-02-06 ASSESSMENT — ENCOUNTER SYMPTOMS
GASTROINTESTINAL NEGATIVE: 1
RESPIRATORY NEGATIVE: 1

## 2025-02-06 NOTE — PROGRESS NOTES
Immunization(s) given during visit:    Immunizations Administered       Name Date Dose Route    Influenza, FLUCELVAX, (age 6 mo+) IM, Trivalent PF, 0.5mL 2/6/2025 0.5 mL Intramuscular    Site: Deltoid- Left    Lot: [id number]    NDC: 50895-376-16            Most recent Vaccine Information Sheet dated 8/6/21 given to pt

## 2025-02-06 NOTE — PROGRESS NOTES
[facility]  [address]  [place] [address]  Dept: [phone]  Dept Fax: [phone]  Loc: [phone]    Adriana Constantino is a 64 y.o. femalewho presents today for her medical conditions/complaints as noted below.  Adriana Constantinois c/o of Follow-up (Wants to discuss medications )      :     HPI    Pt here to discuss medications.     PMH: SAH, GERD, MCA-stroke, osteopenia, DCIS s/p right breast lumpectomy , Mood disorder              Pt with history of SAH and stroke post angiogram in 2016.  Has followed with neurology for 1 yr post episode   Pt denies chronic headaches does have some memory fog and some intermittent skin sensation disturbance on her left side since the stroke.     Had been on a statin post stroke but had stopped.   Past history of tobacco abuse, did  restart lipitor at 20 mg nightly       Recently retired    /Mood changes/Anxiety /MDD  Had some anxiety and mood changes had been taking wellbutrin 300 mg XL daily which has helped but thought not working so stopped it   .   No panic attacks, mother has passed which did alleviate some anxiety     Started lamictal for anxiety and mood changes, irritable at times   Not sure if controlling anxiety   Had been Supplementing with ativan in the meantime, but has been using on a regular basis will discuss long term options.   had dx of breat cancer which caused anxiety '  Uses the ativan at night to fall sleep feels like her mind races       Breast cancer right breat  Has lumpectomy and radiation tx completed now  Follows with oncology yearly   Last mammogram normal 11/2024       Admits to an increase in weight recently and feeling hungry all of the time.  Started on metformin 100 mg bid and it has helped to curb her appetite  Has not lost much weight     No formal exercise program, does not count calories.   Wt Readings from Last 3 Encounters:   02/06/25 76.7 kg (169 lb)   11/26/24 74.8

## 2025-02-06 NOTE — PROGRESS NOTES
Vaccine Information Sheet, \"Influenza - Inactivated\"  given to Adriana Constantino, or parent/legal guardian of  Adriana Constantino and verbalized understanding.    Patient responses:    Have you ever had a reaction to a flu vaccine? No  Do you have an allergy to eggs, neomycin or polymixin?  No  Do you have an allergy to Thimerosal, contact lens solution, or Merthiolate? No  Have you ever had Guillian La Rose Syndrome?  No  Do you have any current illness?  No  Do you have a temperature above 100 degrees? No  Are you pregnant? No  If pregnant, permission obtained from physician? No  Do you have an active neurological disorder? No      Flu vaccine given per order. Please see immunization tab.

## 2025-02-11 LAB
7AMINOCLONAZEPAM UR QL: PRESENT NG/ML
A-OH ALPRAZ UR QL: NOT DETECTED NG/ML
ALPHA-HYDROXYMIDAZOLAM, URINE: NOT DETECTED NG/ML
ALPRAZ UR QL: NOT DETECTED NG/ML
ANTICOAGULANTS, URINE: NOT DETECTED
ANTICONVULSANTS, URINE: PRESENT
ANTIDEPRESSANTS UR QL: NOT DETECTED
ANTIDIABETICS, URINE: NOT DETECTED
ANTIHISTAMINES, URINE: PRESENT
ANTIPSYCHOTICS, URINE: NOT DETECTED
BENZODIAZ UR QL: PRESENT
BRIVARACETAM, URINE: NOT DETECTED NG/ML
CARBAMAZEPINE UR QL: NOT DETECTED NG/ML
CARBAMAZEPINE-10,11-EPOXIDE, URINE: NOT DETECTED NG/ML
CARDIAC, URINE: NOT DETECTED
CHLORDIAZEPOXIDE [PRESENCE] IN URINE: NOT DETECTED NG/ML
CHLORPHENIR UR QL: NOT DETECTED NG/ML
CLOBAZAM UR QL: NOT DETECTED NG/ML
CLONAZEPAM UR QL: NOT DETECTED NG/ML
COMPREHENSIVE DRUG PROMPT: NORMAL
CREAT UR-MCNC: 131.6 MG/DL (ref 20–400)
D-METHORPHAN UR QL: NOT DETECTED
DIAZEPAM UR QL: NOT DETECTED NG/ML
DIHYDRO-10-HYDROXYCARBAMAZEPINE, URINE: NOT DETECTED NG/ML
DIPHENHY UR QL: PRESENT NG/ML
DOXYLAMINE UR QL: NOT DETECTED NG/ML
FELBAMATE, URINE: NOT DETECTED NG/ML
GABAPENTIN UR QL: NOT DETECTED NG/ML
HYDROXYZINE UR QL: NOT DETECTED NG/ML
LACOSAMIDE, URINE: NOT DETECTED NG/ML
LAMOTRIGINE, URINE: PRESENT NG/ML
LEVETIRACETAM, URINE: NOT DETECTED NG/ML
LORAZEPAM UR QL: NOT DETECTED NG/ML
MIDAZOLAM, URINE: NOT DETECTED NG/ML
MUSCLE RELAXANTS, URINE: NOT DETECTED
NICOTINE UR QL: NOT DETECTED
NORDIAZEPAM UR QL: NOT DETECTED NG/ML
NSAIDS, URINE: NOT DETECTED
OPIOIDS, URINE: NOT DETECTED
OXAZEPAM UR QL: NOT DETECTED NG/ML
PHENIRAMINE UR QL: NOT DETECTED NG/ML
PREGABALIN, URINE: NOT DETECTED NG/ML
PRIMIDONE, URINE: NOT DETECTED NG/ML
RUFINAMIDE, URINE: NOT DETECTED NG/ML
SEDATIVE-HYPNOTICS, URINE: NOT DETECTED
STIMULANTS, URINE: NOT DETECTED
TEMAZEPAM UR QL: NOT DETECTED NG/ML
TIAGABINE, URINE: NOT DETECTED NG/ML
TOPIRAMATE, URINE: NOT DETECTED NG/ML
ZOLPIDEM UR QL: NOT DETECTED NG/ML
ZONISAMIDE, URINE: NOT DETECTED NG/ML

## 2025-02-28 ENCOUNTER — LAB (OUTPATIENT)
Dept: LAB | Age: 65
End: 2025-02-28

## 2025-02-28 DIAGNOSIS — Z86.73 HISTORY OF STROKE: ICD-10-CM

## 2025-02-28 DIAGNOSIS — E78.5 DYSLIPIDEMIA: ICD-10-CM

## 2025-02-28 DIAGNOSIS — K21.9 GASTROESOPHAGEAL REFLUX DISEASE WITHOUT ESOPHAGITIS: ICD-10-CM

## 2025-02-28 DIAGNOSIS — R45.86 MOOD SWINGS: ICD-10-CM

## 2025-02-28 DIAGNOSIS — F41.9 ANXIETY: ICD-10-CM

## 2025-02-28 DIAGNOSIS — R45.4 IRRITABILITY: ICD-10-CM

## 2025-02-28 LAB
ALBUMIN SERPL BCG-MCNC: 4.1 G/DL (ref 3.4–4.9)
ALP SERPL-CCNC: 77 U/L (ref 35–104)
ALT SERPL W/O P-5'-P-CCNC: 24 U/L (ref 10–35)
ANION GAP SERPL CALC-SCNC: 9 MEQ/L (ref 8–16)
AST SERPL-CCNC: 24 U/L (ref 10–35)
BASOPHILS ABSOLUTE: 0 THOU/MM3 (ref 0–0.1)
BASOPHILS NFR BLD AUTO: 0.7 %
BILIRUB SERPL-MCNC: 0.4 MG/DL (ref 0.3–1.2)
BUN SERPL-MCNC: 13 MG/DL (ref 8–23)
CALCIUM SERPL-MCNC: 9 MG/DL (ref 8.8–10.2)
CHLORIDE SERPL-SCNC: 103 MEQ/L (ref 98–111)
CHOLEST SERPL-MCNC: 131 MG/DL (ref 100–199)
CO2 SERPL-SCNC: 26 MEQ/L (ref 22–29)
CREAT SERPL-MCNC: 0.8 MG/DL (ref 0.5–0.9)
DEPRECATED RDW RBC AUTO: 44.2 FL (ref 35–45)
EOSINOPHIL NFR BLD AUTO: 5.1 %
EOSINOPHILS ABSOLUTE: 0.4 THOU/MM3 (ref 0–0.4)
ERYTHROCYTE [DISTWIDTH] IN BLOOD BY AUTOMATED COUNT: 13.7 % (ref 11.5–14.5)
GFR SERPL CREATININE-BSD FRML MDRD: 82 ML/MIN/1.73M2
GLUCOSE SERPL-MCNC: 85 MG/DL (ref 74–109)
HCT VFR BLD AUTO: 39.7 % (ref 37–47)
HDLC SERPL-MCNC: 68 MG/DL
HGB BLD-MCNC: 12.7 GM/DL (ref 12–16)
IMM GRANULOCYTES # BLD AUTO: 0.01 THOU/MM3 (ref 0–0.07)
IMM GRANULOCYTES NFR BLD AUTO: 0.1 %
LDLC SERPL CALC-MCNC: 50 MG/DL
LYMPHOCYTES ABSOLUTE: 1.7 THOU/MM3 (ref 1–4.8)
LYMPHOCYTES NFR BLD AUTO: 23.6 %
MCH RBC QN AUTO: 28.3 PG (ref 26–33)
MCHC RBC AUTO-ENTMCNC: 32 GM/DL (ref 32.2–35.5)
MCV RBC AUTO: 88.6 FL (ref 81–99)
MONOCYTES ABSOLUTE: 0.4 THOU/MM3 (ref 0.4–1.3)
MONOCYTES NFR BLD AUTO: 6.1 %
NEUTROPHILS ABSOLUTE: 4.6 THOU/MM3 (ref 1.8–7.7)
NEUTROPHILS NFR BLD AUTO: 64.4 %
NRBC BLD AUTO-RTO: 0 /100 WBC
PLATELET # BLD AUTO: 293 THOU/MM3 (ref 130–400)
PMV BLD AUTO: 9.3 FL (ref 9.4–12.4)
POTASSIUM SERPL-SCNC: 4.1 MEQ/L (ref 3.5–5.2)
PROT SERPL-MCNC: 6.6 G/DL (ref 6.4–8.3)
RBC # BLD AUTO: 4.48 MILL/MM3 (ref 4.2–5.4)
SODIUM SERPL-SCNC: 138 MEQ/L (ref 135–145)
TRIGL SERPL-MCNC: 66 MG/DL (ref 0–199)
TSH SERPL DL<=0.05 MIU/L-ACNC: 2.9 UIU/ML (ref 0.27–4.2)
WBC # BLD AUTO: 7.1 THOU/MM3 (ref 4.8–10.8)

## 2025-03-03 ENCOUNTER — TELEPHONE (OUTPATIENT)
Dept: FAMILY MEDICINE CLINIC | Age: 65
End: 2025-03-03

## 2025-03-03 NOTE — TELEPHONE ENCOUNTER
Left detailed message regarding lab results. Okay per HIPAA. Requested call back at [phone]  if they have any further questions.

## 2025-03-03 NOTE — TELEPHONE ENCOUNTER
----- Message from MARY ALICE Friedman - CNP sent at 3/3/2025  8:52 AM EST -----  Let pt know labs are stable and in appropriate range, continue all current meds

## 2025-03-06 ENCOUNTER — OFFICE VISIT (OUTPATIENT)
Dept: FAMILY MEDICINE CLINIC | Age: 65
End: 2025-03-06
Payer: COMMERCIAL

## 2025-03-06 VITALS
HEIGHT: 64 IN | BODY MASS INDEX: 28.99 KG/M2 | SYSTOLIC BLOOD PRESSURE: 128 MMHG | WEIGHT: 169.8 LBS | DIASTOLIC BLOOD PRESSURE: 78 MMHG | OXYGEN SATURATION: 92 % | HEART RATE: 65 BPM | RESPIRATION RATE: 16 BRPM | TEMPERATURE: 98.2 F

## 2025-03-06 DIAGNOSIS — K21.9 GASTROESOPHAGEAL REFLUX DISEASE WITHOUT ESOPHAGITIS: ICD-10-CM

## 2025-03-06 DIAGNOSIS — D05.11 NEOPLASM OF RIGHT BREAST, PRIMARY TUMOR STAGING CATEGORY TIS: DUCTAL CARCINOMA IN SITU (DCIS): ICD-10-CM

## 2025-03-06 DIAGNOSIS — R45.4 IRRITABILITY: ICD-10-CM

## 2025-03-06 DIAGNOSIS — Z86.000 HISTORY OF DUCTAL CARCINOMA IN SITU (DCIS) OF RIGHT BREAST: ICD-10-CM

## 2025-03-06 DIAGNOSIS — F51.04 PSYCHOPHYSIOLOGICAL INSOMNIA: Primary | ICD-10-CM

## 2025-03-06 PROCEDURE — 99214 OFFICE O/P EST MOD 30 MIN: CPT | Performed by: NURSE PRACTITIONER

## 2025-03-06 RX ORDER — TRAZODONE HYDROCHLORIDE 50 MG/1
75 TABLET ORAL NIGHTLY PRN
Qty: 135 TABLET | Refills: 4 | Status: SHIPPED | OUTPATIENT
Start: 2025-03-06

## 2025-03-06 ASSESSMENT — ENCOUNTER SYMPTOMS: RESPIRATORY NEGATIVE: 1

## 2025-03-06 NOTE — PROGRESS NOTES
(around 9/6/2025) for Physical Exam and med refill .    Reccommended tobaccocessation options including pharmacologic methods, counseled great than 3 minutesduring this visit:  Yes[]  No  []       Patient given educational materials -see patient instructions.  Discussed use, benefit, and side effects of prescribedmedications.  All patient questions answered.  Pt voiced understanding. Reviewedhealth maintenance.  Instructed to continue current medications, diet and exercise.Patient agreed with treatment plan. Follow up as directed.       Electronicallysigned by MARY ALICE Friedman CNP on 3/6/2025 at 11:18 AM

## 2025-03-14 ENCOUNTER — OFFICE VISIT (OUTPATIENT)
Dept: ONCOLOGY | Age: 65
End: 2025-03-14

## 2025-03-14 ENCOUNTER — HOSPITAL ENCOUNTER (OUTPATIENT)
Dept: INFUSION THERAPY | Age: 65
Discharge: HOME | End: 2025-03-14
Payer: COMMERCIAL

## 2025-03-14 VITALS
TEMPERATURE: 98.5 F | OXYGEN SATURATION: 98 % | BODY MASS INDEX: 28.85 KG/M2 | RESPIRATION RATE: 18 BRPM | DIASTOLIC BLOOD PRESSURE: 72 MMHG | WEIGHT: 169 LBS | HEART RATE: 69 BPM | SYSTOLIC BLOOD PRESSURE: 132 MMHG | HEIGHT: 64 IN

## 2025-03-14 DIAGNOSIS — Z86.000 ENCOUNTER FOR FOLLOW-UP SURVEILLANCE OF DUCTAL CARCINOMA IN SITU OF BREAST: ICD-10-CM

## 2025-03-14 DIAGNOSIS — D05.11 DUCTAL CARCINOMA IN SITU (DCIS) OF RIGHT BREAST: ICD-10-CM

## 2025-03-14 DIAGNOSIS — Z08 ENCOUNTER FOR FOLLOW-UP SURVEILLANCE OF DUCTAL CARCINOMA IN SITU OF BREAST: ICD-10-CM

## 2025-03-14 DIAGNOSIS — Z51.81 ENCOUNTER FOR MONITORING AROMATASE INHIBITOR THERAPY: ICD-10-CM

## 2025-03-14 DIAGNOSIS — Z79.811 ENCOUNTER FOR MONITORING AROMATASE INHIBITOR THERAPY: ICD-10-CM

## 2025-03-14 DIAGNOSIS — Z12.31 VISIT FOR SCREENING MAMMOGRAM: Primary | ICD-10-CM

## 2025-03-14 PROCEDURE — 99211 OFF/OP EST MAY X REQ PHY/QHP: CPT

## 2025-03-14 RX ORDER — ANASTROZOLE 1 MG/1
1 TABLET ORAL DAILY
Qty: 90 TABLET | Refills: 5 | Status: SHIPPED | OUTPATIENT
Start: 2025-03-14

## 2025-03-14 NOTE — PROGRESS NOTES
[facility] [address], [address]  [facility] [address]  Dept: [phone]  Dept Fax: [phone] Loc: [phone]      Visit Date:3/14/2025     Adriana Constantino is a 65 y.o. female who presents today for:   Chief Complaint   Patient presents with    Follow-up     RIGHT BREAST DCIS *3 month f/u   Anastrozole needs refilled          HPI:   Adriana Constantino is a 65 y.o. female followed in the clinic for right breast DCIS. As per Dr. Ferrari's note on 6/13/2024:    initial consultation for ductal carcinoma in situ of the right breast status post lumpectomy and re-excision of the surgical pramod.Core biopsy ( 11/21/23) with findings of complex intracystic papillary lesion with ductal carcinoma in situ, estrogen receptor positive, progesterone receptor positive.Right lumpectomy was performed ( 12/7/23)  path with findings of  Multifocal low-grade ductal carcinoma in situ, with margin less than 1mm,re- resection was performed on 12/15/2023 with pathology will findings of negative for carcinoma or in -situ findings, rare atypical ductal proliferation.  She denies breast changes, breast lumps, nipple changes ,nipple discharge. Post operative recovery un eventful.    2/5/2024:  Mild skin changes, erythema secondary to adjuvant radiation under the right breast.  Afebrile.     3/7/2024:  Tolerating AI without issues.     6/13/2024:  Patient reports night sweats and cramping in her legs.  Lost weight.  (171-163.4 lb's)    Interval History 3/14/2025:   The patient is here for follow up evaluation. She was last seen in clinic in June 2024.  She denies any new breast changes.  The patient affirms chronic nipple inversion which is unchanged.  She denies palpable masses, skin changes or concern for lymphedema.  She affirms tolerating AI.  She reports hot flashes. Denies unintentional weight loss, poor appetite, early satiety, new headaches, or new bone pain.      PMH, SH, and FH:  I reviewed the

## 2025-05-16 DIAGNOSIS — K21.9 GASTROESOPHAGEAL REFLUX DISEASE, UNSPECIFIED WHETHER ESOPHAGITIS PRESENT: ICD-10-CM

## 2025-05-16 DIAGNOSIS — M85.80 OSTEOPENIA, UNSPECIFIED LOCATION: ICD-10-CM

## 2025-05-16 RX ORDER — CHOLECALCIFEROL (VITAMIN D3) 50 MCG
1 TABLET ORAL DAILY
Qty: 90 TABLET | Refills: 4 | Status: SHIPPED | OUTPATIENT
Start: 2025-05-16

## 2025-05-16 RX ORDER — OMEPRAZOLE 20 MG/1
20 CAPSULE, DELAYED RELEASE ORAL 2 TIMES DAILY
Qty: 180 CAPSULE | Refills: 4 | Status: SHIPPED | OUTPATIENT
Start: 2025-05-16

## 2025-05-16 NOTE — TELEPHONE ENCOUNTER
Recent Visits  Date Type Provider Dept   03/06/25 Office Visit Bee Ward, APRN - CNP [facility]   02/06/25 Office Visit Bee Ward, APRN - CNP [facility]   07/30/24 Office Visit Bee Ward, APRN - CNP [facility]   04/30/24 Office Visit Bee Ward, APRN - CNP [facility]   03/05/24 Office Visit Ward Gamboa APRN - CNP [facility]   Showing recent visits within past 540 days with a meds authorizing provider and meeting all other requirements  Future Appointments  Date Type Provider Dept   09/08/25 Appointment Ward Gamboa APRN - CNP [facility]   Showing future appointments within next 150 days with a meds authorizing provider and meeting all other requirements

## 2025-05-21 ENCOUNTER — RESULTS FOLLOW-UP (OUTPATIENT)
Dept: FAMILY MEDICINE CLINIC | Age: 65
End: 2025-05-21

## 2025-05-21 ENCOUNTER — LAB (OUTPATIENT)
Dept: LAB | Age: 65
End: 2025-05-21

## 2025-05-21 DIAGNOSIS — R63.2 EXCESSIVE HUNGER: ICD-10-CM

## 2025-05-21 LAB
DEPRECATED MEAN GLUCOSE BLD GHB EST-ACNC: 108 MG/DL (ref 70–126)
HBA1C MFR BLD HPLC: 5.6 % (ref 4–6)

## 2025-05-28 ENCOUNTER — OFFICE VISIT (OUTPATIENT)
Dept: FAMILY MEDICINE CLINIC | Age: 65
End: 2025-05-28
Payer: COMMERCIAL

## 2025-05-28 VITALS
BODY MASS INDEX: 30.29 KG/M2 | DIASTOLIC BLOOD PRESSURE: 78 MMHG | OXYGEN SATURATION: 99 % | WEIGHT: 177.4 LBS | TEMPERATURE: 98 F | HEIGHT: 64 IN | SYSTOLIC BLOOD PRESSURE: 134 MMHG | HEART RATE: 78 BPM | RESPIRATION RATE: 16 BRPM

## 2025-05-28 DIAGNOSIS — T88.7XXA SIDE EFFECT OF MEDICATION: ICD-10-CM

## 2025-05-28 DIAGNOSIS — R63.8 CRAVING FOR PARTICULAR FOOD: Primary | ICD-10-CM

## 2025-05-28 DIAGNOSIS — F41.9 ANXIETY: ICD-10-CM

## 2025-05-28 DIAGNOSIS — Z51.81 MEDICATION MONITORING ENCOUNTER: ICD-10-CM

## 2025-05-28 LAB
AMPHETAMINES UR QL SCN: NEGATIVE
BARBITURATES UR QL SCN: NEGATIVE
BENZODIAZ UR QL SCN: NEGATIVE
BUPRENORPHINE URINE: NEGATIVE
BZE UR QL SCN: NEGATIVE
CANNABINOIDS UR QL SCN: NEGATIVE
FENTANYL: NEGATIVE
OPIATES UR QL SCN: NEGATIVE
OXYCODONE: NEGATIVE
PCP UR QL SCN: NEGATIVE

## 2025-05-28 PROCEDURE — 99214 OFFICE O/P EST MOD 30 MIN: CPT | Performed by: NURSE PRACTITIONER

## 2025-05-28 RX ORDER — BUPROPION HYDROCHLORIDE 150 MG/1
150 TABLET ORAL EVERY MORNING
Qty: 30 TABLET | Refills: 3 | Status: SHIPPED | OUTPATIENT
Start: 2025-05-28

## 2025-05-28 RX ORDER — PHENTERMINE HYDROCHLORIDE 37.5 MG/1
37.5 TABLET ORAL
Qty: 30 TABLET | Refills: 0 | Status: SHIPPED | OUTPATIENT
Start: 2025-05-28 | End: 2025-06-27

## 2025-05-28 ASSESSMENT — ENCOUNTER SYMPTOMS: RESPIRATORY NEGATIVE: 1

## 2025-05-28 NOTE — PROGRESS NOTES
[facility]  [facility]  [address]  [place] [address]  Dept: [phone]  Dept Fax: [phone]  Loc: [phone]    Adriana Constantino is a 65 y.o. femalewho presents today for her medical conditions/complaints as noted below.  Adriana Constantinois c/o of Follow-up (Always hungry, never feels full. Ongoing for years/ weight loss)      :     HPI    Wants to discuss weight loss   Admits to an increase in weight recently and feeling hungry all of the time.  Started on metformin 1000 mg bid and it has helped to curb her appetite in past   Has not lost much weight   -hungry all the time eats a lot  Not exercising      Discussed low calorie low carb diet  Discussed wiegtt loss meds GLP-1 adiphex and wellbutrin  Can't afford GLP-1  Discussed lamictal may be adding to food cravings,     /Mood changes/Anxiety /MDD  Had some anxiety and mood changes had been taking wellbutrin 300 mg XL daily which has helped but thought not working so stopped it .   No panic attacks, mother has passed which did alleviate some anxiety   Started lamictal 50 mg daily and buspar prn  for anxiety and mood changes, irritable at times   Not sure if controlling anxiety   Had been Supplementing with ativan in the meantime, but has been using on a regular basis and  discussed  long term options. And ativan stopped after last visit pt doing well without it   Taking trazodone 50 mg at hs is helping sleeping better still gets up a few times  Will increase to 75 mg at hs. Sleeping better   Does have buspar if needed    Lab Results   Component Value Date    WBC 7.1 02/28/2025    HGB 12.7 02/28/2025    HCT 39.7 02/28/2025    MCV 88.6 02/28/2025     02/28/2025      Lab Results   Component Value Date     02/28/2025    K 4.1 02/28/2025     02/28/2025    CO2 26 02/28/2025    BUN 13 02/28/2025    CREATININE 0.8 02/28/2025    GLUCOSE 85 02/28/2025    CALCIUM 9.0 02/28/2025    BILITOT 0.4

## 2025-06-25 ENCOUNTER — TELEPHONE (OUTPATIENT)
Dept: FAMILY MEDICINE CLINIC | Age: 65
End: 2025-06-25

## 2025-06-25 ENCOUNTER — LAB (OUTPATIENT)
Dept: FAMILY MEDICINE CLINIC | Age: 65
End: 2025-06-25

## 2025-06-25 VITALS — BODY MASS INDEX: 28.87 KG/M2 | WEIGHT: 168.2 LBS

## 2025-06-25 RX ORDER — PHENTERMINE HYDROCHLORIDE 37.5 MG/1
37.5 TABLET ORAL
Qty: 30 TABLET | Refills: 0 | Status: SHIPPED | OUTPATIENT
Start: 2025-06-25 | End: 2025-07-25

## 2025-06-25 NOTE — PROGRESS NOTES
Patient came in for a NV for weight check.    Wt Readings from Last 3 Encounters:   06/25/25 76.3 kg (168 lb 3.2 oz)   05/28/25 80.5 kg (177 lb 6.4 oz)   03/14/25 76.7 kg (169 lb)      Future Appointments   Date Time Provider Department Center   6/25/2025 11:20 AM SCHEDULE, NURSE [facility] ECC DEP   7/29/2025  1:00 PM Ward Gamboa APRN - CNP [facility] ECC DEP   9/8/2025 10:40 AM Ward Gamboa APRN - CNP [facility] ECC DEP   10/30/2025  5:00 PM STR ULTRASOUND RM 2 [facility] US STR Rad/Card   11/3/2025 11:15 AM Ray Macario PA [facility]   11/28/2025 11:20 AM STR MAMMOGRAPHY RM2  [facility] WOMEN STR Rad/Card   12/2/2025  9:40 AM Jana Aguilar PA-C N Oncology [facility]

## 2025-06-25 NOTE — TELEPHONE ENCOUNTER
Wt Readings from Last 3 Encounters:   06/25/25 76.3 kg (168 lb 3.2 oz)   05/28/25 80.5 kg (177 lb 6.4 oz)   03/14/25 76.7 kg (169 lb)     Patient came in for weight check.    Use the pharmacy at [facility].    Future Appointments   Date Time Provider Department Center   6/25/2025 11:20 AM SCHEDULE, NURSE [facility] ECC DEP   7/29/2025  1:00 PM Ward Gamboa APRN - CNP [facility] ECC DEP   9/8/2025 10:40 AM Ward Gamboa APRN - CNP [facility] Med [facility] ECC DEP   10/30/2025  5:00 PM STR ULTRASOUND RM 2 [facility] US STR Rad/Card   11/3/2025 11:15 AM Ray Macario PA [facility]   11/28/2025 11:20 AM STR MAMMOGRAPHY RM2  [facility] WOMEN STR Rad/Card   12/2/2025  9:40 AM Jana Aguilar PA-C N On[facility]

## 2025-06-25 NOTE — TELEPHONE ENCOUNTER
Left detailed message rx . Okay per HIPAA. Requested call back at [phone]  if they have any further questions.

## 2025-06-25 NOTE — TELEPHONE ENCOUNTER
Let pt know adiphex is working well for her will continue with adiphex and daily physical activity f/u in office for next weight check as scheduled.

## 2025-06-29 DIAGNOSIS — R63.8 CRAVING FOR PARTICULAR FOOD: ICD-10-CM

## 2025-06-30 RX ORDER — BUPROPION HYDROCHLORIDE 150 MG/1
150 TABLET ORAL EVERY MORNING
Qty: 90 TABLET | Refills: 4 | Status: SHIPPED | OUTPATIENT
Start: 2025-06-30

## 2025-06-30 NOTE — TELEPHONE ENCOUNTER
Recent Visits  Date Type Provider Dept   05/28/25 Office Visit Ward Gamboa APRN - CNP [facility]   03/06/25 Office Visit Ward Gamboa APRN - CNP [facility]   02/06/25 Office Visit Ward Gamboa APRN - CNP [facility]   07/30/24 Office Visit Ward Gamboa APRN - CNP [facility]   04/30/24 Office Visit Ward Gamboa APRN - CNP [facility]   03/05/24 Office Visit Ward Gamboa APRN - CNP [facility]   Showing recent visits within past 540 days with a meds authorizing provider and meeting all other requirements  Future Appointments  Date Type Provider Dept   07/29/25 Appointment Ward Gamboa APRN - CNP [facility]   09/08/25 Appointment Ward Gamboa APRN - CNP [facility]   Showing future appointments within next 150 days with a meds authorizing provider and meeting all other requirements

## 2025-07-20 ENCOUNTER — HOSPITAL ENCOUNTER (EMERGENCY)
Age: 65
Discharge: HOME | End: 2025-07-20
Attending: EMERGENCY MEDICINE
Payer: COMMERCIAL

## 2025-07-20 ENCOUNTER — APPOINTMENT (OUTPATIENT)
Dept: GENERAL RADIOLOGY | Age: 65
End: 2025-07-20
Payer: COMMERCIAL

## 2025-07-20 VITALS
SYSTOLIC BLOOD PRESSURE: 131 MMHG | RESPIRATION RATE: 12 BRPM | BODY MASS INDEX: 30.04 KG/M2 | OXYGEN SATURATION: 96 % | WEIGHT: 175 LBS | TEMPERATURE: 97.7 F | HEART RATE: 86 BPM | DIASTOLIC BLOOD PRESSURE: 77 MMHG

## 2025-07-20 DIAGNOSIS — M54.50 ACUTE LEFT-SIDED LOW BACK PAIN WITHOUT SCIATICA: ICD-10-CM

## 2025-07-20 DIAGNOSIS — M62.838 SPASM OF MUSCLE: Primary | ICD-10-CM

## 2025-07-20 LAB
ALBUMIN SERPL BCG-MCNC: 3.9 G/DL (ref 3.4–4.9)
ALP SERPL-CCNC: 72 U/L (ref 38–126)
ALT SERPL W/O P-5'-P-CCNC: 22 U/L (ref 10–35)
ANION GAP SERPL CALC-SCNC: 10 MEQ/L (ref 8–16)
AST SERPL-CCNC: 22 U/L (ref 10–35)
BASOPHILS ABSOLUTE: 0 THOU/MM3 (ref 0–0.1)
BASOPHILS NFR BLD AUTO: 0.6 %
BILIRUB SERPL-MCNC: 0.4 MG/DL (ref 0.3–1.2)
BUN SERPL-MCNC: 14 MG/DL (ref 8–23)
CALCIUM SERPL-MCNC: 9 MG/DL (ref 8.8–10.2)
CHLORIDE SERPL-SCNC: 107 MEQ/L (ref 98–111)
CO2 SERPL-SCNC: 24 MEQ/L (ref 22–29)
CREAT SERPL-MCNC: 0.9 MG/DL (ref 0.5–0.9)
DEPRECATED RDW RBC AUTO: 43.8 FL (ref 35–45)
EKG ATRIAL RATE: 77 BPM
EKG P AXIS: 69 DEGREES
EKG P-R INTERVAL: 150 MS
EKG Q-T INTERVAL: 382 MS
EKG QRS DURATION: 84 MS
EKG QTC CALCULATION (BAZETT): 432 MS
EKG R AXIS: 35 DEGREES
EKG T AXIS: 65 DEGREES
EKG VENTRICULAR RATE: 77 BPM
EOSINOPHIL NFR BLD AUTO: 7 %
EOSINOPHILS ABSOLUTE: 0.4 THOU/MM3 (ref 0–0.4)
ERYTHROCYTE [DISTWIDTH] IN BLOOD BY AUTOMATED COUNT: 13.6 % (ref 11.5–14.5)
GFR SERPL CREATININE-BSD FRML MDRD: 71 ML/MIN/1.73M2
GLUCOSE SERPL-MCNC: 101 MG/DL (ref 74–109)
HCT VFR BLD AUTO: 39.3 % (ref 37–47)
HGB BLD-MCNC: 12.8 GM/DL (ref 12–16)
IMM GRANULOCYTES # BLD AUTO: 0.01 THOU/MM3 (ref 0–0.07)
IMM GRANULOCYTES NFR BLD AUTO: 0.2 %
LYMPHOCYTES ABSOLUTE: 1.3 THOU/MM3 (ref 1–4.8)
LYMPHOCYTES NFR BLD AUTO: 25.2 %
MCH RBC QN AUTO: 28.7 PG (ref 26–33)
MCHC RBC AUTO-ENTMCNC: 32.6 GM/DL (ref 32.2–35.5)
MCV RBC AUTO: 88.1 FL (ref 81–99)
MONOCYTES ABSOLUTE: 0.3 THOU/MM3 (ref 0.4–1.3)
MONOCYTES NFR BLD AUTO: 6.3 %
NEUTROPHILS ABSOLUTE: 3.1 THOU/MM3 (ref 1.8–7.7)
NEUTROPHILS NFR BLD AUTO: 60.7 %
NRBC BLD AUTO-RTO: 0 /100 WBC
OSMOLALITY SERPL CALC.SUM OF ELEC: 281.9 MOSMOL/KG (ref 275–300)
PLATELET # BLD AUTO: 251 THOU/MM3 (ref 130–400)
PMV BLD AUTO: 8.9 FL (ref 9.4–12.4)
POTASSIUM SERPL-SCNC: 4.8 MEQ/L (ref 3.5–5.2)
PROT SERPL-MCNC: 6.2 G/DL (ref 6.4–8.3)
RBC # BLD AUTO: 4.46 MILL/MM3 (ref 4.2–5.4)
SODIUM SERPL-SCNC: 141 MEQ/L (ref 135–145)
TROPONIN, HIGH SENSITIVITY: 6 NG/L (ref 0–12)
WBC # BLD AUTO: 5.1 THOU/MM3 (ref 4.8–10.8)

## 2025-07-20 PROCEDURE — 85025 COMPLETE CBC W/AUTO DIFF WBC: CPT

## 2025-07-20 PROCEDURE — 71046 X-RAY EXAM CHEST 2 VIEWS: CPT

## 2025-07-20 PROCEDURE — 36415 COLL VENOUS BLD VENIPUNCTURE: CPT

## 2025-07-20 PROCEDURE — 93005 ELECTROCARDIOGRAM TRACING: CPT

## 2025-07-20 PROCEDURE — 6360000002 HC RX W HCPCS: Performed by: EMERGENCY MEDICINE

## 2025-07-20 PROCEDURE — 6370000000 HC RX 637 (ALT 250 FOR IP)

## 2025-07-20 PROCEDURE — 84484 ASSAY OF TROPONIN QUANT: CPT

## 2025-07-20 PROCEDURE — 99285 EMERGENCY DEPT VISIT HI MDM: CPT

## 2025-07-20 PROCEDURE — 80053 COMPREHEN METABOLIC PANEL: CPT

## 2025-07-20 PROCEDURE — 96372 THER/PROPH/DIAG INJ SC/IM: CPT

## 2025-07-20 PROCEDURE — 93010 ELECTROCARDIOGRAM REPORT: CPT | Performed by: NUCLEAR MEDICINE

## 2025-07-20 RX ORDER — LIDOCAINE 4 G/G
1 PATCH TOPICAL DAILY
Qty: 30 PATCH | Refills: 0 | Status: SHIPPED | OUTPATIENT
Start: 2025-07-20 | End: 2025-08-19

## 2025-07-20 RX ORDER — ORPHENADRINE CITRATE 30 MG/ML
60 INJECTION INTRAMUSCULAR; INTRAVENOUS ONCE
Status: DISCONTINUED | OUTPATIENT
Start: 2025-07-20 | End: 2025-07-20

## 2025-07-20 RX ORDER — ORPHENADRINE CITRATE 30 MG/ML
60 INJECTION INTRAMUSCULAR; INTRAVENOUS ONCE
Status: COMPLETED | OUTPATIENT
Start: 2025-07-20 | End: 2025-07-20

## 2025-07-20 RX ORDER — LIDOCAINE 4 G/G
1 PATCH TOPICAL DAILY
Status: DISCONTINUED | OUTPATIENT
Start: 2025-07-20 | End: 2025-07-20 | Stop reason: HOSPADM

## 2025-07-20 RX ORDER — IBUPROFEN 200 MG
400 TABLET ORAL ONCE
Status: COMPLETED | OUTPATIENT
Start: 2025-07-20 | End: 2025-07-20

## 2025-07-20 RX ORDER — ORPHENADRINE CITRATE 100 MG/1
100 TABLET ORAL 2 TIMES DAILY
Qty: 20 TABLET | Refills: 0 | Status: SHIPPED | OUTPATIENT
Start: 2025-07-20 | End: 2025-07-30

## 2025-07-20 RX ORDER — ACETAMINOPHEN 325 MG/1
650 TABLET ORAL ONCE
Status: COMPLETED | OUTPATIENT
Start: 2025-07-20 | End: 2025-07-20

## 2025-07-20 RX ADMIN — IBUPROFEN 400 MG: 200 TABLET, FILM COATED ORAL at 11:50

## 2025-07-20 RX ADMIN — ACETAMINOPHEN 650 MG: 325 TABLET ORAL at 11:50

## 2025-07-20 RX ADMIN — ORPHENADRINE CITRATE 60 MG: 60 INJECTION INTRAMUSCULAR; INTRAVENOUS at 13:07

## 2025-07-20 ASSESSMENT — PAIN SCALES - GENERAL: PAINLEVEL_OUTOF10: 3

## 2025-07-20 ASSESSMENT — PAIN DESCRIPTION - ORIENTATION: ORIENTATION: MID;UPPER

## 2025-07-20 ASSESSMENT — HEART SCORE: ECG: NORMAL

## 2025-07-20 ASSESSMENT — PAIN DESCRIPTION - LOCATION: LOCATION: BACK

## 2025-07-20 NOTE — ED PROVIDER NOTES
[facility] EMERGENCY DEPARTMENT  EMERGENCY DEPARTMENT ENCOUNTER          Pt Name: Adriana Constantino  MRN: [id number]  Birthdate 1960  Date of evaluation: 7/20/2025  Physician: Sharon Trotter MD  Supervising Attending Physician: Aleksander Dawkins DO      CHIEF COMPLAINT       Chief Complaint   Patient presents with    Back Pain     Between shoulder blades         HISTORY OF PRESENT ILLNESS    HPI  Adriana Constantino is a 65 y.o. female, PMH subarachnoid hemorrhage in 2016, GERD, breast cancer, who presents to the emergency department from home for evaluation of left-sided back pain.  She states that this pain started 2 days ago when she was bending down while gardening, but woke her up from her sleep this morning.  Pain is located on the left lateral back at the level of ribs 9-12. It is described as constant and dull.  She is unsure if it changes with activity.  She reports improvement with Tylenol and ibuprofen this morning.  She is concerned about cardiac causes of this pain given the left-sided location, but is unsure if she has had chest pain with it.  No headache, dizziness, weakness, paresthesias, dysuria, frequency, hematuria, shortness of air, pain with inspiration, dizziness, headache, fever, abdominal pain. No cardiac history. No history of back surgery.    The patient has no other acute complaints at this time.      PAST MEDICAL AND SURGICAL HISTORY     Past Medical History:   Diagnosis Date    Arthritis     Breast cancer (HCC) 11/21/2023    Rt DCIS    Cerebral artery occlusion with cerebral infarction (HCC)     Ductal carcinoma in situ (DCIS) of right breast 11/21/2023    with papilloma    GERD (gastroesophageal reflux disease)     Subarachnoid hemorrhage (HCC) 2016     Past Surgical History:   Procedure Laterality Date    BLEPHAROPLASTY Bilateral 06/28/2021    BILATERAL UPPER AND LOWER BLEPHAROPLASTY performed by Travis Bueno MD at [facility] OR    BREAST

## 2025-07-20 NOTE — ED NOTES
Patient presents to the ED with concerns of upper mid back pain that started yesterday and awoke her from her sleep. Patient states her pain was at a 7/10 but is now at a 3/10 after taking tylenol.

## 2025-07-29 ENCOUNTER — OFFICE VISIT (OUTPATIENT)
Dept: FAMILY MEDICINE CLINIC | Age: 65
End: 2025-07-29
Payer: COMMERCIAL

## 2025-07-29 VITALS
HEART RATE: 76 BPM | DIASTOLIC BLOOD PRESSURE: 74 MMHG | BODY MASS INDEX: 28.51 KG/M2 | WEIGHT: 167 LBS | RESPIRATION RATE: 16 BRPM | SYSTOLIC BLOOD PRESSURE: 128 MMHG | TEMPERATURE: 97.9 F | HEIGHT: 64 IN | OXYGEN SATURATION: 98 %

## 2025-07-29 DIAGNOSIS — M85.80 OSTEOPENIA, UNSPECIFIED LOCATION: ICD-10-CM

## 2025-07-29 DIAGNOSIS — R09.89 THROAT CLEARING: ICD-10-CM

## 2025-07-29 DIAGNOSIS — Z87.891 HISTORY OF TOBACCO ABUSE: ICD-10-CM

## 2025-07-29 DIAGNOSIS — J30.89 SEASONAL ALLERGIC RHINITIS DUE TO OTHER ALLERGIC TRIGGER: ICD-10-CM

## 2025-07-29 DIAGNOSIS — J98.11 ATELECTASIS OF BOTH LUNGS: ICD-10-CM

## 2025-07-29 PROCEDURE — 99214 OFFICE O/P EST MOD 30 MIN: CPT | Performed by: NURSE PRACTITIONER

## 2025-07-29 RX ORDER — PHENTERMINE HYDROCHLORIDE 37.5 MG/1
37.5 TABLET ORAL
Qty: 30 TABLET | Refills: 0 | Status: SHIPPED | OUTPATIENT
Start: 2025-07-29 | End: 2025-08-28

## 2025-07-29 RX ORDER — LORATADINE 10 MG/1
10 TABLET ORAL DAILY
Qty: 90 TABLET | Refills: 1 | Status: SHIPPED | OUTPATIENT
Start: 2025-07-29

## 2025-07-29 ASSESSMENT — ENCOUNTER SYMPTOMS
SORE THROAT: 0
TROUBLE SWALLOWING: 0
GASTROINTESTINAL NEGATIVE: 1
RESPIRATORY NEGATIVE: 1
SINUS PRESSURE: 0
RHINORRHEA: 1
SINUS PAIN: 0
VOICE CHANGE: 0

## 2025-07-29 NOTE — PROGRESS NOTES
AND LOWER BLEPHAROPLASTY performed by Travis Bueno MD at [facility] OR    BREAST LUMPECTOMY Right 2023    DCIS, RIGHT BREAST LUMPECTOMY WITH PREOPERATIVE NEEDLE LOCALIZATION performed by Raghavendra Rosas MD at [facility] OR    BREAST SURGERY Right 12/15/2023    DCIS, Re-Excision Right Breast Margins performed by Raghavendra Rosas MD at [facility] OR    [facility] US GUIDANCE NEEDLE PLACEMENT IMG S&I      COLONOSCOPY      ENDOSCOPY, COLON, DIAGNOSTIC      ALYCIA US GUID NDL BIOPSY RIGHT Right 2023    DCIS    TUBAL LIGATION      US ASP/INJ THYROID CYST  10/04/2023    US THYROID CYST ASPIRATION AND OR INJECTION 10/4/2023 [facility] ULTRASOUND    US PLACE BREAST LOC DEVICE 1ST LESION RIGHT Right 2023    US GUIDED NEEDLE LOC OF RIGHT BREAST 2023 Anthony Norwood MD [facility]     Family History   Problem Relation Age of Onset    Hypertension Mother     Arthritis Mother     Diabetes Father     Lung Cancer Father     Lung Cancer Sister 65    Hypertension Brother     Breast Cancer Maternal Grandmother 65    Uterine Cancer Maternal Grandmother         >65    Lung Cancer Maternal Uncle     Lung Cancer Maternal Uncle     Lung Cancer Maternal Uncle      Social History     Tobacco Use    Smoking status: Former     Current packs/day: 0.00     Average packs/day: 0.5 packs/day for 26.0 years (13.0 ttl pk-yrs)     Types: Cigarettes     Start date: 3/7/1983     Quit date: 3/7/2009     Years since quittin.4    Smokeless tobacco: Never   Substance Use Topics    Alcohol use: Yes     Comment: occas        Allergies   Allergen Reactions    Ciprofloxacin Rash    Keflex [Cephalexin] Rash     Many years ago        Health Maintenance   Topic Date Due    Shingles vaccine (1 of 2) Never done    Respiratory Syncytial Virus (RSV) Pregnant or age 60 yrs+ (1 - Risk 60-74 years 1-dose series) Never done    COVID-19 Vaccine (2024- season) 2024    Colorectal Cancer Screen  2025    Pneumococcal 50+ years

## 2025-08-01 ENCOUNTER — HOSPITAL ENCOUNTER (OUTPATIENT)
Dept: CT IMAGING | Age: 65
Discharge: HOME | End: 2025-08-01
Payer: COMMERCIAL

## 2025-08-01 ENCOUNTER — RESULTS FOLLOW-UP (OUTPATIENT)
Dept: FAMILY MEDICINE CLINIC | Age: 65
End: 2025-08-01

## 2025-08-01 DIAGNOSIS — R91.1 RIGHT UPPER LOBE PULMONARY NODULE: Primary | ICD-10-CM

## 2025-08-01 DIAGNOSIS — Z87.891 HISTORY OF TOBACCO ABUSE: ICD-10-CM

## 2025-08-01 DIAGNOSIS — J98.11 ATELECTASIS OF BOTH LUNGS: ICD-10-CM

## 2025-08-01 PROCEDURE — 71271 CT THORAX LUNG CANCER SCR C-: CPT

## 2025-08-01 NOTE — TELEPHONE ENCOUNTER
Patient informed and verbalized understanding.    Denied being transferred to central scheduling and stated she will call over herself to get this scheduled.

## 2025-08-01 NOTE — TELEPHONE ENCOUNTER
----- Message from MARY ALICE Friedman - CNP sent at 8/1/2025 10:36 AM EDT -----  Let pt know her CT chest identifies a  3 mm RUL nodule, likely a granuloma(scar tissue), and scr tissue of left lung base that is minimal along with a gallstone in the gallbladder, guidelines recommend repeating test in 6 months to verify stability of the RUL nodule, orders placed, please schedule 6 months out Thanks Let me know if questions

## 2025-08-05 ENCOUNTER — TELEPHONE (OUTPATIENT)
Dept: FAMILY MEDICINE CLINIC | Age: 65
End: 2025-08-05

## 2025-09-03 ENCOUNTER — PATIENT MESSAGE (OUTPATIENT)
Dept: FAMILY MEDICINE CLINIC | Age: 65
End: 2025-09-03

## 2025-09-03 DIAGNOSIS — M85.80 OSTEOPENIA, UNSPECIFIED LOCATION: ICD-10-CM

## 2025-09-03 DIAGNOSIS — F41.9 ANXIETY: ICD-10-CM

## 2025-09-03 DIAGNOSIS — F51.04 PSYCHOPHYSIOLOGICAL INSOMNIA: ICD-10-CM

## 2025-09-03 DIAGNOSIS — R63.8 CRAVING FOR PARTICULAR FOOD: ICD-10-CM

## 2025-09-03 DIAGNOSIS — R45.4 IRRITABILITY: ICD-10-CM

## 2025-09-03 RX ORDER — LAMOTRIGINE 25 MG/1
50 TABLET ORAL DAILY
Qty: 180 TABLET | Refills: 3 | Status: SHIPPED | OUTPATIENT
Start: 2025-09-03

## 2025-09-03 RX ORDER — TRAZODONE HYDROCHLORIDE 50 MG/1
75 TABLET ORAL NIGHTLY PRN
Qty: 135 TABLET | Refills: 4 | Status: SHIPPED | OUTPATIENT
Start: 2025-09-03

## 2025-09-03 RX ORDER — PHENTERMINE HYDROCHLORIDE 37.5 MG/1
37.5 TABLET ORAL
Qty: 30 TABLET | Refills: 0 | Status: SHIPPED | OUTPATIENT
Start: 2025-09-03 | End: 2025-10-03

## 2025-09-03 RX ORDER — BUPROPION HYDROCHLORIDE 150 MG/1
150 TABLET ORAL EVERY MORNING
Qty: 90 TABLET | Refills: 4 | Status: SHIPPED | OUTPATIENT
Start: 2025-09-03

## 2025-09-03 RX ORDER — ALENDRONATE SODIUM 70 MG/1
70 TABLET ORAL
Qty: 12 TABLET | Refills: 4 | Status: SHIPPED | OUTPATIENT
Start: 2025-09-03

## (undated) DEVICE — LIQUIBAND RAPID ADHESIVE 36/CS 0.8ML: Brand: MEDLINE

## (undated) DEVICE — SUTURE PROL SZ 5-0 L18IN NONABSORBABLE BLU L13MM P-3 3/8 8698G

## (undated) DEVICE — SUTURE PERMAHAND SZ 6-0 L18IN NONABSORBABLE BLK L11MM P-1 639G

## (undated) DEVICE — SUTURE VCRL SZ 5-0 L18IN ABSRB UD P-2 L18MM 1/2 CIR PRIM J503G

## (undated) DEVICE — SOLUTION IRRIG 1500ML STRL H2O USP POUR PLAS BTL

## (undated) DEVICE — TUBING, SUCTION, 1/4" X 12', STRAIGHT: Brand: MEDLINE

## (undated) DEVICE — NON COATED ELECTROSURGICAL NEEDLE ELECTRODE, 2.75 INCH (7 CM): Brand: MEGADYNE

## (undated) DEVICE — APPLIER LIG CLP M L11IN TI STR RNG HNDL FOR 20 CLP DISP

## (undated) DEVICE — SHEET, ORTHO, SPLIT, STERILE: Brand: MEDLINE

## (undated) DEVICE — SPONGE GZ W4XL4IN COT 12 PLY TYP VII WVN C FLD DSGN

## (undated) DEVICE — SUTURE ETHBND EXCEL SZ 3-0 L36IN NONABSORBABLE GRN L22MM X762H

## (undated) DEVICE — SPECIMEN ORIENTATION CHARMS, SIX DISTINCTLY SHAPED STERILE 10MM CHARMS: Brand: MARGINMAP

## (undated) DEVICE — GLOVE SURG SZ 7.5 L11.73IN FNGR THK9.8MIL STRW LTX POLYMER

## (undated) DEVICE — PENCIL SMK EVAC 15FT BLADE ELECTRD ROCKER F/TELSCP

## (undated) DEVICE — GOWN,SIRUS,NON REINFRCD,LARGE,SET IN SL: Brand: MEDLINE

## (undated) DEVICE — BREAST HERNIA: Brand: MEDLINE INDUSTRIES, INC.

## (undated) DEVICE — SUTURE MCRYL SZ 3-0 L27IN ABSRB UD L26MM SH 1/2 CIR Y416H

## (undated) DEVICE — SUTURE PLN GUT SZ 5-0 L18IN ABSRB YELLOWISH TAN L13MM PC-1 1915G

## (undated) DEVICE — SUTURE VCRL P-1 PRECIS PNT REV CUT 3/8 CIR 11MM 18 IN SZ J490G

## (undated) DEVICE — SUTURE VCRL + SZ 4-0 L27IN ABSRB WHT FS-2 3/8 CIR REV CUT VCP422H

## (undated) DEVICE — GLOVE ORANGE PI 7 1/2   MSG9075

## (undated) DEVICE — PAD,EYE,1-5/8X2 5/8,STERILE,LF,1/PK: Brand: MEDLINE

## (undated) DEVICE — SUTURE MCRYL SZ 4-0 L18IN ABSRB UD P-3 L13MM 3/8 CIR PRIM Y494G

## (undated) DEVICE — 3M™ STERI-STRIP™ REINFORCED ADHESIVE SKIN CLOSURES, R1547, 1/2 IN X 4 IN (12 MM X 100 MM), 6 STRIPS/ENVELOPE: Brand: 3M™ STERI-STRIP™

## (undated) DEVICE — SUTURE PERMA-HAND SZ 2-0 L30IN NONABSORBABLE BLK L26MM SH K833H

## (undated) DEVICE — STANDARD HYPODERMIC NEEDLE,POLYPROPYLENE HUB: Brand: MONOJECT

## (undated) DEVICE — Device

## (undated) DEVICE — BLADE,CARBON-STEEL,15,STRL,DISPOSABLE,TB: Brand: MEDLINE

## (undated) DEVICE — SUTURE VCRL + SZ 3-0 L27IN ABSRB UD L26MM SH 1/2 CIR VCP416H

## (undated) DEVICE — SOLUTION IV IRRIG POUR BRL 0.9% SODIUM CHL 2F7124

## (undated) DEVICE — GLOVE ORANGE PI 7   MSG9070

## (undated) DEVICE — GLOVE SURG SZ 8 L11.77IN FNGR THK9.8MIL STRW LTX POLYMER

## (undated) DEVICE — PACK PROCEDURE SURG PLAS SC MIN SRHP LF

## (undated) DEVICE — APPLICATOR PREP 26ML 0.7% IOD POVACRYLEX 74% ISO ALC ST